# Patient Record
Sex: MALE | Race: AMERICAN INDIAN OR ALASKA NATIVE
[De-identification: names, ages, dates, MRNs, and addresses within clinical notes are randomized per-mention and may not be internally consistent; named-entity substitution may affect disease eponyms.]

---

## 2019-07-01 ENCOUNTER — HOSPITAL ENCOUNTER (INPATIENT)
Dept: HOSPITAL 5 - ED | Age: 74
LOS: 8 days | Discharge: HOME | DRG: 682 | End: 2019-07-09
Attending: INTERNAL MEDICINE | Admitting: INTERNAL MEDICINE
Payer: MEDICARE

## 2019-07-01 DIAGNOSIS — N17.0: Primary | ICD-10-CM

## 2019-07-01 DIAGNOSIS — Z87.891: ICD-10-CM

## 2019-07-01 DIAGNOSIS — I16.0: ICD-10-CM

## 2019-07-01 DIAGNOSIS — E87.6: ICD-10-CM

## 2019-07-01 DIAGNOSIS — J44.9: ICD-10-CM

## 2019-07-01 DIAGNOSIS — Z91.14: ICD-10-CM

## 2019-07-01 DIAGNOSIS — E44.0: ICD-10-CM

## 2019-07-01 DIAGNOSIS — R13.10: ICD-10-CM

## 2019-07-01 DIAGNOSIS — I11.0: ICD-10-CM

## 2019-07-01 DIAGNOSIS — Z88.1: ICD-10-CM

## 2019-07-01 DIAGNOSIS — I42.0: ICD-10-CM

## 2019-07-01 DIAGNOSIS — E87.0: ICD-10-CM

## 2019-07-01 DIAGNOSIS — J96.01: ICD-10-CM

## 2019-07-01 DIAGNOSIS — I50.43: ICD-10-CM

## 2019-07-01 PROCEDURE — 83735 ASSAY OF MAGNESIUM: CPT

## 2019-07-01 PROCEDURE — 93010 ELECTROCARDIOGRAM REPORT: CPT

## 2019-07-01 PROCEDURE — 82550 ASSAY OF CK (CPK): CPT

## 2019-07-01 PROCEDURE — 82553 CREATINE MB FRACTION: CPT

## 2019-07-01 PROCEDURE — 96374 THER/PROPH/DIAG INJ IV PUSH: CPT

## 2019-07-01 PROCEDURE — G0480 DRUG TEST DEF 1-7 CLASSES: HCPCS

## 2019-07-01 PROCEDURE — 36415 COLL VENOUS BLD VENIPUNCTURE: CPT

## 2019-07-01 PROCEDURE — 83970 ASSAY OF PARATHORMONE: CPT

## 2019-07-01 PROCEDURE — 80053 COMPREHEN METABOLIC PANEL: CPT

## 2019-07-01 PROCEDURE — 85025 COMPLETE CBC W/AUTO DIFF WBC: CPT

## 2019-07-01 PROCEDURE — 93005 ELECTROCARDIOGRAM TRACING: CPT

## 2019-07-01 PROCEDURE — 84484 ASSAY OF TROPONIN QUANT: CPT

## 2019-07-01 PROCEDURE — 83880 ASSAY OF NATRIURETIC PEPTIDE: CPT

## 2019-07-01 PROCEDURE — 93306 TTE W/DOPPLER COMPLETE: CPT

## 2019-07-01 PROCEDURE — 74230 X-RAY XM SWLNG FUNCJ C+: CPT

## 2019-07-01 PROCEDURE — 71045 X-RAY EXAM CHEST 1 VIEW: CPT

## 2019-07-01 PROCEDURE — 84300 ASSAY OF URINE SODIUM: CPT

## 2019-07-01 PROCEDURE — 80048 BASIC METABOLIC PNL TOTAL CA: CPT

## 2019-07-01 PROCEDURE — 82570 ASSAY OF URINE CREATININE: CPT

## 2019-07-01 PROCEDURE — 80320 DRUG SCREEN QUANTALCOHOLS: CPT

## 2019-07-01 PROCEDURE — 76770 US EXAM ABDO BACK WALL COMP: CPT

## 2019-07-01 PROCEDURE — 84156 ASSAY OF PROTEIN URINE: CPT

## 2019-07-01 PROCEDURE — 81001 URINALYSIS AUTO W/SCOPE: CPT

## 2019-07-02 LAB
ALBUMIN SERPL-MCNC: 3.2 G/DL (ref 3.9–5)
ALT SERPL-CCNC: 19 UNITS/L (ref 7–56)
BASOPHILS # (AUTO): 0.1 K/MM3 (ref 0–0.1)
BASOPHILS NFR BLD AUTO: 1.5 % (ref 0–1.8)
BILIRUB UR QL STRIP: (no result)
BLOOD UR QL VISUAL: (no result)
BUN SERPL-MCNC: 26 MG/DL (ref 9–20)
BUN SERPL-MCNC: 29 MG/DL (ref 9–20)
BUN/CREAT SERPL: 16 %
BUN/CREAT SERPL: 17 %
CALCIUM SERPL-MCNC: 8.1 MG/DL (ref 8.4–10.2)
CALCIUM SERPL-MCNC: 8.5 MG/DL (ref 8.4–10.2)
CREATININE,URINE: 64.4 MG/DL (ref 0.1–20)
EOSINOPHIL # BLD AUTO: 0.2 K/MM3 (ref 0–0.4)
EOSINOPHIL NFR BLD AUTO: 4.9 % (ref 0–4.3)
HCT VFR BLD CALC: 40.6 % (ref 35.5–45.6)
HEMOLYSIS INDEX: 2
HEMOLYSIS INDEX: 4
HGB BLD-MCNC: 13.1 GM/DL (ref 11.8–15.2)
LYMPHOCYTES # BLD AUTO: 0.9 K/MM3 (ref 1.2–5.4)
LYMPHOCYTES NFR BLD AUTO: 19.6 % (ref 13.4–35)
MCHC RBC AUTO-ENTMCNC: 32 % (ref 32–34)
MCV RBC AUTO: 88 FL (ref 84–94)
MONOCYTES # (AUTO): 0.5 K/MM3 (ref 0–0.8)
MONOCYTES % (AUTO): 10.7 % (ref 0–7.3)
PH UR STRIP: 6 [PH] (ref 5–7)
PLATELET # BLD: 310 K/MM3 (ref 140–440)
PROT UR STRIP-MCNC: (no result) MG/DL
PROTEIN/CREATININE RATIO,URINE: 0.29
RBC # BLD AUTO: 4.63 M/MM3 (ref 3.65–5.03)
RBC #/AREA URNS HPF: 1 /HPF (ref 0–6)
UROBILINOGEN UR-MCNC: 4 MG/DL (ref ?–2)
WBC #/AREA URNS HPF: < 1 /HPF (ref 0–6)

## 2019-07-02 RX ADMIN — ACETAMINOPHEN PRN MG: 325 TABLET ORAL at 21:54

## 2019-07-02 RX ADMIN — AMLODIPINE BESYLATE SCH MG: 10 TABLET ORAL at 12:48

## 2019-07-02 RX ADMIN — HEPARIN SODIUM SCH UNIT: 5000 INJECTION, SOLUTION INTRAVENOUS; SUBCUTANEOUS at 12:53

## 2019-07-02 RX ADMIN — Medication SCH ML: at 15:19

## 2019-07-02 RX ADMIN — DOCUSATE SODIUM SCH MG: 100 CAPSULE, LIQUID FILLED ORAL at 12:46

## 2019-07-02 RX ADMIN — DOCUSATE SODIUM SCH MG: 100 CAPSULE, LIQUID FILLED ORAL at 21:54

## 2019-07-02 RX ADMIN — FUROSEMIDE SCH MG: 10 INJECTION, SOLUTION INTRAVENOUS at 12:49

## 2019-07-02 RX ADMIN — ASPIRIN SCH MG: 81 TABLET, CHEWABLE ORAL at 12:49

## 2019-07-02 RX ADMIN — HEPARIN SODIUM SCH: 5000 INJECTION, SOLUTION INTRAVENOUS; SUBCUTANEOUS at 21:58

## 2019-07-02 RX ADMIN — Medication SCH ML: at 21:58

## 2019-07-02 NOTE — HISTORY AND PHYSICAL REPORT
History of Present Illness


Date of examination: 07/02/19


Date of admission: 


07/02/2019


Chief complaint: 





Bilateral lower extremity edema


History of present illness: 





73-year-old -American male with history of COPD, CHF, HTN, throat cancer 

who presents to [facility] ED with complaints of progressively worsening bilateral 

lower extremity pitting edema for the past 2-3 days.  Patient is a poor 

historian and unable to communicate clearly.  He mumbles his words and has poor 

enunciation.  He states that he ran out of his oral diuretic (he thinks its 

Torsemide but is not certain) rapidly to 3 days ago.  Since run out of his 

medication, he is began experiencing progressively worsening shortness of breath

and bilateral lower extremity pitting edema.  He admits to orthopnea and dyspnea

with exertion. 





Denies: Fever, cough, hemoptysis, n/v/d, headache, or recent sick contact





Past History


Past Medical History: cancer (throat cancer), COPD, heart failure, hypertension


Past Surgical History: No surgical history


Social history: no significant social history


Family history: no significant family history





Medications and Allergies


                                    Allergies











Allergy/AdvReac Type Severity Reaction Status Date / Time


 


levofloxacin [From Levaquin] Allergy  Unknown Verified 07/02/19 00:40











                                Home Medications











 Medication  Instructions  Recorded  Confirmed  Last Taken  Type


 


Unobtainable  07/02/19 07/02/19 Unknown History











Active Meds: 


Active Medications





Acetaminophen (Tylenol)  650 mg PO Q4H PRN


   PRN Reason: Pain MILD(1-3)/Fever >100.5/HA


Albuterol (Proventil)  2.5 mg IH Q3HRT PRN


   PRN Reason: Shortness Of Breath


Amlodipine Besylate (Norvasc)  10 mg PO QDAY ARASH


Aspirin (Baby Aspirin)  81 mg PO QDAY ARASH


Docusate Sodium (Colace)  100 mg PO BID ARASH


Furosemide (Lasix)  40 mg IV DAILY ARASH


Heparin Sodium (Porcine) (Heparin)  5,000 unit SUB-Q Q12HR ARASH


Hydralazine HCl (Apresoline)  10 mg IV Q4HR PRN


   PRN Reason: Blood Pressure


Ondansetron HCl (Zofran)  4 mg IV Q8H PRN


   PRN Reason: Nausea And Vomiting


Oxycodone/Acetaminophen (Percocet 5/325)  1 tab PO Q6H PRN


   PRN Reason: Pain, Moderate (4-6)


Sodium Chloride (Sodium Chloride Flush Syringe 10 Ml)  10 ml IV BID ARASH


Sodium Chloride (Sodium Chloride Flush Syringe 10 Ml)  10 ml IV PRN PRN


   PRN Reason: LINE FLUSH











Review of Systems


All systems: negative (reviewed and no additional remarkable complaints except 

as noted below)


Cardiovascular: orthopnea, edema, shortness of breath, dyspnea on exertion, high

blood pressure, leg edema


Respiratory: shortness of breath, dyspnea on exertion





Exam





- Physical Exam


Narrative exam: 





Physical exam





General appearance: Present: Mild distress, poor historian, mumbling of words, 

awake, well-developed, older adult -American male





- EENT


Eyes: Present: PERRL, EOM intact


ENT: hearing intact, poor dentition





- Neck


Neck: Present: supple, normal ROM





- Respiratory


Respiratory effort: Non-labored


Respiratory: Bibasilar crackles R>L





- Cardiovascular


Heart rate:  59 (bpm)


Rhythm: Sinus bradycardia, nonspecific T wave abnormalities


Heart Sounds: Present: S1 & S2.  Absent: rub, click





- Extremities


Extremities: no ischemia, pulses intact, abnormal (bilateral lower extremity 2+ 

pitting edema)








- Peripheral Assessment


Peripheral Pulses: within normal limits


 


- Abdominal


General gastrointestinal: soft, non-tender, normal bowel sounds





- Integumentary


Integumentary: Present: warm, dry





- Musculoskeletal


Musculoskeletal: generalized weakness, able to move all extremities








- Psychiatric


Psychiatric: cooperative





- Constitutional


Vitals: 


                                        











Temp Pulse Resp BP Pulse Ox


 


 97.9 F   68   14   171/85   91 


 


 07/02/19 00:15  07/02/19 03:45  07/02/19 04:31  07/02/19 04:31  07/02/19 04:15














Results





- Labs


CBC & Chem 7: 


                                 07/02/19 01:11





                                 07/02/19 01:11


Labs: 


                             Laboratory Last Values











WBC  4.7 K/mm3 (4.5-11.0)   07/02/19  01:11    


 


RBC  4.63 M/mm3 (3.65-5.03)   07/02/19  01:11    


 


Hgb  13.1 gm/dl (11.8-15.2)   07/02/19  01:11    


 


Hct  40.6 % (35.5-45.6)   07/02/19  01:11    


 


MCV  88 fl (84-94)   07/02/19  01:11    


 


MCH  28 pg (28-32)   07/02/19  01:11    


 


MCHC  32 % (32-34)   07/02/19  01:11    


 


RDW  20.8 % (13.2-15.2)  H  07/02/19  01:11    


 


Plt Count  310 K/mm3 (140-440)   07/02/19  01:11    


 


Lymph % (Auto)  19.6 % (13.4-35.0)   07/02/19  01:11    


 


Mono % (Auto)  10.7 % (0.0-7.3)  H  07/02/19  01:11    


 


Eos % (Auto)  4.9 % (0.0-4.3)  H  07/02/19  01:11    


 


Baso % (Auto)  1.5 % (0.0-1.8)   07/02/19  01:11    


 


Lymph #  0.9 K/mm3 (1.2-5.4)  L  07/02/19  01:11    


 


Mono #  0.5 K/mm3 (0.0-0.8)   07/02/19  01:11    


 


Eos #  0.2 K/mm3 (0.0-0.4)   07/02/19  01:11    


 


Baso #  0.1 K/mm3 (0.0-0.1)   07/02/19  01:11    


 


Seg Neutrophils %  63.3 % (40.0-70.0)   07/02/19  01:11    


 


Seg Neutrophils #  3.0 K/mm3 (1.8-7.7)   07/02/19  01:11    


 


Sodium  147 mmol/L (137-145)  H  07/02/19  01:11    


 


Potassium  3.1 mmol/L (3.6-5.0)  L  07/02/19  01:11    


 


Chloride  104.3 mmol/L ()   07/02/19  01:11    


 


Carbon Dioxide  30 mmol/L (22-30)   07/02/19  01:11    


 


  16 mmol/L  07/02/19  01:11    


 


BUN  29 mg/dL (9-20)  H  07/02/19  01:11    


 


  1.8 mg/dL (0.8-1.5)  H  07/02/19  01:11    


 


Estimated GFR  37 ml/min  07/02/19  01:11    


 


  16 %  07/02/19  01:11    


 


Glucose  108 mg/dL ()  H  07/02/19  01:11    


 


Calcium  8.5 mg/dL (8.4-10.2)   07/02/19  01:11    


 


  1.30 mg/dL (0.1-1.2)  H  07/02/19  01:11    


 


AST  17 units/L (5-40)   07/02/19  01:11    


 


ALT  19 units/L (7-56)   07/02/19  01:11    


 


  172 units/L ()  H  07/02/19  01:11    


 


  64 units/L ()   07/02/19  01:11    


 


CK-MB (CK-2)  1.8 ng/mL (0.0-4.0)   07/02/19  01:11    


 


CK-MB (CK-2) Rel Index  2.8  (0-4)   07/02/19  01:11    


 


  0.014 ng/mL (0.00-0.029)   07/02/19  01:11    


 


NT-Pro-B Natriuret Pep  12261 pg/mL (0-900)  H  07/02/19  01:11    


 


  6.4 g/dL (6.3-8.2)   07/02/19  01:11    


 


  3.2 g/dL (3.9-5)  L  07/02/19  01:11    


 


  1.0 %  07/02/19  01:11    


 


Plasma/Serum Alcohol  < 0.01 % (0-0.07)   07/02/19  01:11    














- Imaging and Cardiology


EKG: image reviewed (59 bpm, sinus bradycardia)


Chest x-ray: report reviewed ( Cardiac silhouette is moderately enlarged. It is 

unclear if this is secondary to cardiomegaly or), image reviewed





Assessment and Plan


Assessment and plan: 





73-year-old -American male with history of COPD, CHF, HTN, throat cancer 

who presents to [facility] ED with complaints of progressively worsening bilateral 

lower extremity pitting edema for the past 2-3 days.  On examination patient has

bilateral lower extremity 2+ pitting edema.  On auscultation by basilar crackles

R>L. chest x-ray is suggestive of volume overload/CHF exacerbation.  Received IV

Lasix 40 mg 1 and ED; will continue to diurese.  Will admit to telemetry.





Acute exacerbation of CHF


Hypertensive urgency


Acute hypoxic respiratory failure


JAYDEN 


?? CKD3


Hypokalemia


Mild to moderate malnutrition


Elevated BNP 








Plan:


Continue supportive care


Continuous telemetry monitoring


Start IV Lasix 40mg daily


Saturation in low 80's on RA; Continue supplemental oxygen and wean as tolerated


Albuterol when necessary


Monitor BP


IV hydralazine when necessary


Start Norvasc 10mg daily


Monitor renal function


Creatinine this admission 1.8, baseline unknown; GFR 37 likely due to chronic 

renal failure


Nephrology consulted for JAYDEN


Avoid nephro toxic agents


Monitor electrolytes, replete as needed


Order IV potassium 40 mEq


[place] [address], likely due to heart failure


Swallow screen pending; if pass then will order diet; encourage oral intake


Dietitian consult pending


DVT PPX on Heparin





Advance Directives: No


VTE prophylaxis?: Chemical


Reason for no VTE Prophylaxis: Surgical contraindication

## 2019-07-02 NOTE — XRAY REPORT
CHEST 1 VIEW 



INDICATION / CLINICAL INFORMATION:

shortness of breath.



COMPARISON: 

None available.



FINDINGS:



SUPPORT DEVICES: None.



HEART / MEDIASTINUM: Cardiac silhouette is moderately enlarged. 



LUNGS / PLEURA: No significant pulmonary or pleural abnormality. No pneumothorax. 



ADDITIONAL FINDINGS: No significant additional findings.



IMPRESSION:

1. Cardiac silhouette is moderately enlarged. It is unclear if this is secondary to cardiomegaly or p
ericardial effusion.



Signer Name: Alise Roman MD 

 Signed: 7/2/2019 1:09 AM 

 Workstation Name: [id number]

## 2019-07-02 NOTE — CONSULTATION
History of Present Illness





- Reason for Consult


Consult date: 07/02/19


acute renal failure, hypokalemia





- History of Present Illness


The patient is a 74 YO AAM with history significant for HTN, COPD, CHF and 

Throat cancer who presented to [facility] ED with complaints of progressively 

worsening sob and bilateral leg swelling for the past 2-3 days.  Patient is a 

very poor historian and also mumbles his words.  There was no family member at 

the bedside.  He ran out of his oral diuretic about 3 days ago.  He also admits 

to orthopnea and KILLIAN.  Labs were significant for creatinine 1.8, Sodium 147 and 

potassium 3.1.  Nephrology was consulted for evaluation of Acute kidney injury.





Past History


Past Medical History: cancer (throat cancer), COPD, heart failure, hypertension


Past Surgical History: No surgical history


Social history: no significant social history


Family history: no significant family history





Medications and Allergies


                                    Allergies











Allergy/AdvReac Type Severity Reaction Status Date / Time


 


levofloxacin [From Levaquin] Allergy  Unknown Verified 07/02/19 00:40











                                Home Medications











 Medication  Instructions  Recorded  Confirmed  Last Taken  Type


 


Unobtainable  07/02/19 07/02/19 Unknown History











Active Meds: 


Active Medications





Acetaminophen (Tylenol)  650 mg PO Q4H PRN


   PRN Reason: Pain MILD(1-3)/Fever >100.5/HA


Albuterol (Proventil)  2.5 mg IH Q3HRT PRN


   PRN Reason: Shortness Of Breath


Amlodipine Besylate (Norvasc)  10 mg PO QDAY ARASH


Aspirin (Baby Aspirin)  81 mg PO QDAY ARASH


Docusate Sodium (Colace)  100 mg PO BID ARASH


Furosemide (Lasix)  40 mg IV DAILY ARASH


Heparin Sodium (Porcine) (Heparin)  5,000 unit SUB-Q Q12HR ARASH


Hydralazine HCl (Apresoline)  10 mg IV Q4H PRN


   PRN Reason: Blood Pressure


Ondansetron HCl (Zofran)  4 mg IV Q8H PRN


   PRN Reason: Nausea And Vomiting


Oxycodone/Acetaminophen (Percocet 5/325)  1 tab PO Q6H PRN


   PRN Reason: Pain, Moderate (4-6)


Sodium Chloride (Sodium Chloride Flush Syringe 10 Ml)  10 ml IV BID ARASH


Sodium Chloride (Sodium Chloride Flush Syringe 10 Ml)  10 ml IV PRN PRN


   PRN Reason: LINE FLUSH











Review of Systems


ROS unobtainable: due to mental status (unable to obtain)





Exam





- Vital Signs


Vital signs: 


                                   Vital Signs











Temp Pulse Resp BP Pulse Ox


 


 97.9 F   60   12   155/77   100 


 


 07/02/19 00:15  07/02/19 00:15  07/02/19 00:15  07/02/19 00:15  07/02/19 00:15














- General Appearance


General appearance: well-developed, well-nourished, appears stated age, other 

(not in distress)


EENT: ATNC, PERRL, mucous membranes moist, hearing diminished


Neck: Present: neck supple, trachea midline


Respiratory: Rales


Heart: regular, S1S2, no murmurs


Gastrointestinal: Present: normoactive bowel sounds.  Absent: tenderness, 

distended


Integumentary: no rash, warm and dry


Neurologic: no focal deficit, no asterixis, other (?confused, mumbling)


Musculoskeletal: Present: other (b/l LE 2+ edema noted.)





Results





- Lab Results





                                 07/02/19 01:11





                                 07/02/19 01:11


                             Most recent lab results











Calcium  8.5 mg/dL (8.4-10.2)   07/02/19  01:11    














- Image


Kidney/bladder ultrasound: pending





Assessment and Plan





1. Acute kidney injury:


Likely Vasomotor JAYDEN in the setting of CHF.


Suspect Cardio-renal syndrome.


Baseline reanl function is unknown.


Urine lytes and Renal US ordered.


Monitor renal function.


Renal prognosis is guarded.


Avoid nephrotoxic agents.


Meds dosage based on GFR.





2. FEN:


Hypernatremia, monitor.


Hypokalemia, replete K.


Monitor lytes.





3. Acute decompensated CHF.





4. Hypertension:


Monitor BP.





5. H/o throat cancer.

## 2019-07-02 NOTE — EMERGENCY DEPARTMENT REPORT
HPI





- General


Chief Complaint: Extremity Injury, Lower


Time Seen by Provider: 19 00:32





- HPI


HPI: 





Room 6








The patient is 73-year-old male presenting with chief complaint of bilateral 

lower extremity edema.  The patient has a history of CHF and states she's been 

out of his diuretic for the past 2-3 days.  The patient states for the past 2-3 

days he has developed worsening bilateral lower extremity edema.  Patient also 

states he's been feeling short of breath 1 day.








Location: [See above]


Duration: [See above]


Quality:  [See above]


Severity:  [See above]


Modifying factors: [see above]


Context: [see above]


Mode of transportation: [not driving]





ED Past Medical Hx





- Past Medical History


Previous Medical History?: Yes


Hx Hypertension: Yes


Hx Congestive Heart Failure: Yes


Hx COPD: Yes


Additional medical history: Throat cancer





- Surgical History


Past Surgical History?: No





- Family History


Family history: no significant





- Social History


Smoking Status: Former Smoker (none since )


Substance Use Type: None





- Medications


Home Medications: 


                                Home Medications











 Medication  Instructions  Recorded  Confirmed  Last Taken  Type


 


Unobtainable  19 Unknown History














ED Review of Systems


ROS: 


Stated complaint: BILATERAL LEG SWELLING


Other details as noted in HPI





Constitutional: no symptoms reported


Eyes: denies: eye pain


Respiratory: shortness of breath


Cardiovascular: denies: chest pain


Endocrine: no symptoms reported


Gastrointestinal: denies: abdominal pain


Genitourinary: denies: dysuria


Musculoskeletal: denies: back pain


Neurological: denies: headache





Physical Exam





- Physical Exam


Vital Signs: 


                                   Vital Signs











  19





  00:15


 


Temperature 97.9 F


 


Pulse Rate 60


 


Respiratory 12





Rate 


 


Blood Pressure 155/77


 


O2 Sat by Pulse 100





Oximetry 











Physical Exam: 





GENERAL: The patient is well-developed well-nourished male lying on stretcher 

not appearing to be in acute distress. []


HEENT: Normocephalic.  Atraumatic.  Extraocular motions are intact.  Patient has

 moist mucous membranes.


NECK: Supple.  Trachea midline


CHEST/LUNGS: Crackles at the left base.  There is no respiratory distress noted.


HEART/CARDIOVASCULAR: Regular.  There is no tachycardia.  There is no gallop rub

 or murmur.


ABDOMEN: Abdomen is soft, nontender.  Patient has normal bowel sounds.  There is

 no abdominal distention.


SKIN: There is no rash.  There is 2+ bilateral lower extremity pitting edema.  

There is no diaphoresis.


NEURO: The patient is awake, alert, and oriented.  The patient is cooperative.  

The patient has mumbling speech with poor enunciation.  There are no focal 

neurologic deficits.  Cranial nerves II through XII grossly intact, no drift


MUSCULOSKELETAL:  There is no evidence of acute injury.





ED Course


                                   Vital Signs











  19





  00:15


 


Temperature 97.9 F


 


Pulse Rate 60


 


Respiratory 12





Rate 


 


Blood Pressure 155/77


 


O2 Sat by Pulse 100





Oximetry 














ED Medical Decision Making





- Lab Data


Result diagrams: 


                                 19 01:11





                                 19 01:11





                                Laboratory Tests











  19





  01:11 01:11 01:11


 


WBC  4.7  


 


RBC  4.63  


 


Hgb  13.1  


 


Hct  40.6  


 


MCV  88  


 


MCH  28  


 


MCHC  32  


 


RDW  20.8 H  


 


Plt Count  310  


 


Lymph % (Auto)  19.6  


 


Mono % (Auto)  10.7 H  


 


Eos % (Auto)  4.9 H  


 


Baso % (Auto)  1.5  


 


Lymph #  0.9 L  


 


Mono #  0.5  


 


Eos #  0.2  


 


Baso #  0.1  


 


Seg Neutrophils %  63.3  


 


Seg Neutrophils #  3.0  


 


Sodium   147 H 


 


Potassium   3.1 L 


 


Chloride   104.3 


 


Carbon Dioxide   30 


 


Anion Gap   16 


 


BUN   29 H 


 


Creatinine   1.8 H 


 


Estimated GFR   37 


 


BUN/Creatinine Ratio   16 


 


Glucose   108 H 


 


Calcium   8.5 


 


Total Bilirubin   1.30 H 


 


AST   17 


 


ALT   19 


 


Alkaline Phosphatase   172 H 


 


Total Creatine Kinase   64 


 


CK-MB (CK-2)   1.8 


 


CK-MB (CK-2) Rel Index   2.8 


 


Troponin T   0.014 


 


NT-Pro-B Natriuret Pep   67286 H 


 


Total Protein   6.4 


 


Albumin   3.2 L 


 


Albumin/Globulin Ratio   1.0 


 


Plasma/Serum Alcohol    < 0.01














- EKG Data


-: EKG Interpreted by Me


EKG shows normal: sinus rhythm


Rate: bradycardia (59 bpm)





- EKG Data


When compared to previous EKG there are: previous EKG unavailable


Interpretation: nonspecific ST-T wave marcos (T-wave inversion in lead V5, V6, 2, 

3, aVF)





- Radiology Data


Radiology results: report reviewed (chest x-ray), image reviewed (chest x-ray)


interpreted by me: 





Chest x-ray-cardiomegaly





[facility] [address] [place] [address] 

XRay Report Signed Patient: ЮЛИЯ CLEVELAND MR#: [id number] : 1945 

Acct:[id number] Age/Sex: 73 / M ADM Date: 19 Loc: ED Attending Dr: 

Ordering Physician: JOI SABILLON MD Date of Service: 19 Procedure(s): XR 

chest 1V ap Accession Number(s): [id number] cc: JOI SABILLON MD Fluoro Time In Min

utes: CHEST 1 VIEW INDICATION / CLINICAL INFORMATION: shortness of breath. 

COMPARISON: None available. FINDINGS: SUPPORT DEVICES: None. HEART / 

MEDIASTINUM: Cardiac silhouette is moderately enlarged. LUNGS / PLEURA: No 

significant pulmonary or pleural abnormality. No pneumothorax. ADDITIONAL 

FINDINGS: No significant additional findings. IMPRESSION: 1. Cardiac silhouette 

is moderately enlarged. It is unclear if this is secondary to cardiomegaly or 

pericardial effusion. Signer Name: Alise Roman MD Signed: 2019 1:09 AM 

Workstation Name: [id number] Transcribed By: REF Dictated By: ARSLAN PARKS MD Electronically Authenticated By: ARSLAN PARKS MD Signed 

Date/Time: 19 DD/DT: 19 TD/TT: 





- Differential Diagnosis


CHF exacerbation, pneumonia, renal failure


Critical care attestation.: 


If time is entered above; I have spent that time in minutes in the direct care 

of this critically ill patient, excluding procedure time.








ED Disposition


Clinical Impression: 


 CHF exacerbation, Shortness of breath, Peripheral edema





Disposition:  OP ADMIT IP TO THIS HOSP


Is pt being admited?: Yes


Does the pt Need Aspirin: Yes


Condition: Fair


Time of Disposition: 04:21 (hospitalist notified (Aidee))

## 2019-07-02 NOTE — FLUOROSCOPY REPORT
FLUOROSCOPY BARIUM SWALLOW MODIFIED



HISTORY: Dysphagia.



FINDINGS: Fluoroscopy was provided by radiology during modified barium swallow by speech pathology. 2
 fluoroscopic images were saved. 1.5 minutes of fluoroscopy time was utilized. Aspiration with thin l
iquids was witnessed during this exam. Please correlate with the formal report by speech pathology.



IMPRESSION: Successful modified barium swallow.



Signer Name: Jermain Canchola Jr, MD 

 Signed: 7/2/2019 1:42 PM 

 Workstation Name: [id number]

## 2019-07-02 NOTE — EVENT NOTE
Date: 07/02/19





patient had 6 runs of V.tach around 17:10. patient was seen and evaluated. 

patient didn't have chest pain or SOB. he is c/o SOB. He had hypokalemia this 

morning and was repleted. will check potassium and Mg stat. Will do EKG. Will 

get cardiology consult. Discussed the plan of care with the patient's nurse.

## 2019-07-02 NOTE — EVENT NOTE
Date: 07/02/19





73-year-old male with past medical history significant for CHF, throat cancer 

was admitted this morning for bilateral leg swelling, medication non-compliance,

JAYDEN and electrolyte abnormalities. Nephrology consulted. continue management as 

outlined in H/P.

## 2019-07-03 LAB
BASOPHILS # (AUTO): 0.1 K/MM3 (ref 0–0.1)
BASOPHILS NFR BLD AUTO: 1.4 % (ref 0–1.8)
BUN SERPL-MCNC: 21 MG/DL (ref 9–20)
BUN SERPL-MCNC: 23 MG/DL (ref 9–20)
BUN/CREAT SERPL: 16 %
BUN/CREAT SERPL: 19 %
CALCIUM SERPL-MCNC: 8.7 MG/DL (ref 8.4–10.2)
CALCIUM SERPL-MCNC: 8.7 MG/DL (ref 8.4–10.2)
EOSINOPHIL # BLD AUTO: 0.3 K/MM3 (ref 0–0.4)
EOSINOPHIL NFR BLD AUTO: 7.5 % (ref 0–4.3)
HCT VFR BLD CALC: 40.4 % (ref 35.5–45.6)
HEMOLYSIS INDEX: 2
HEMOLYSIS INDEX: 3
HGB BLD-MCNC: 13.1 GM/DL (ref 11.8–15.2)
LYMPHOCYTES # BLD AUTO: 1.1 K/MM3 (ref 1.2–5.4)
LYMPHOCYTES NFR BLD AUTO: 25.7 % (ref 13.4–35)
MCHC RBC AUTO-ENTMCNC: 33 % (ref 32–34)
MCV RBC AUTO: 87 FL (ref 84–94)
MONOCYTES # (AUTO): 0.5 K/MM3 (ref 0–0.8)
MONOCYTES % (AUTO): 10.7 % (ref 0–7.3)
PLATELET # BLD: 309 K/MM3 (ref 140–440)
RBC # BLD AUTO: 4.67 M/MM3 (ref 3.65–5.03)

## 2019-07-03 RX ADMIN — POTASSIUM CHLORIDE SCH MEQ: 1.5 POWDER, FOR SOLUTION ORAL at 13:45

## 2019-07-03 RX ADMIN — OXYCODONE AND ACETAMINOPHEN PRN TAB: 5; 325 TABLET ORAL at 07:55

## 2019-07-03 RX ADMIN — FUROSEMIDE SCH MG: 10 INJECTION, SOLUTION INTRAVENOUS at 09:58

## 2019-07-03 RX ADMIN — OXYCODONE AND ACETAMINOPHEN PRN TAB: 5; 325 TABLET ORAL at 13:40

## 2019-07-03 RX ADMIN — ASPIRIN SCH MG: 81 TABLET, CHEWABLE ORAL at 09:58

## 2019-07-03 RX ADMIN — HEPARIN SODIUM SCH: 5000 INJECTION, SOLUTION INTRAVENOUS; SUBCUTANEOUS at 22:07

## 2019-07-03 RX ADMIN — ACETAMINOPHEN PRN MG: 325 TABLET ORAL at 22:05

## 2019-07-03 RX ADMIN — HEPARIN SODIUM SCH: 5000 INJECTION, SOLUTION INTRAVENOUS; SUBCUTANEOUS at 10:00

## 2019-07-03 RX ADMIN — Medication SCH ML: at 22:07

## 2019-07-03 RX ADMIN — DOCUSATE SODIUM SCH MG: 100 CAPSULE, LIQUID FILLED ORAL at 22:05

## 2019-07-03 RX ADMIN — HYDRALAZINE HYDROCHLORIDE PRN MG: 20 INJECTION INTRAMUSCULAR; INTRAVENOUS at 13:40

## 2019-07-03 RX ADMIN — POTASSIUM CHLORIDE SCH MEQ: 1.5 POWDER, FOR SOLUTION ORAL at 08:40

## 2019-07-03 RX ADMIN — HYDRALAZINE HYDROCHLORIDE PRN MG: 20 INJECTION INTRAMUSCULAR; INTRAVENOUS at 06:03

## 2019-07-03 RX ADMIN — HEPARIN SODIUM SCH UNIT: 5000 INJECTION, SOLUTION INTRAVENOUS; SUBCUTANEOUS at 09:59

## 2019-07-03 RX ADMIN — OXYCODONE AND ACETAMINOPHEN PRN TAB: 5; 325 TABLET ORAL at 18:30

## 2019-07-03 RX ADMIN — DOCUSATE SODIUM SCH MG: 100 CAPSULE, LIQUID FILLED ORAL at 09:58

## 2019-07-03 RX ADMIN — Medication SCH ML: at 09:59

## 2019-07-03 RX ADMIN — ACETAMINOPHEN PRN MG: 325 TABLET ORAL at 05:57

## 2019-07-03 RX ADMIN — AMLODIPINE BESYLATE SCH MG: 10 TABLET ORAL at 09:59

## 2019-07-03 NOTE — PROGRESS NOTE
Assessment and Plan





1. Acute kidney injury:


Likely Vasomotor JAYDEN in the setting of CHF.


Suspect Cardio-renal syndrome.


Renal US was negative for hydro.


Renal function is improving.


Monitor renal function.


Avoid nephrotoxic agents.


Meds dosage based on GFR.





2. FEN:


Hypernatremia, monitor.


Hypokalemia, replete K.


Monitor lytes.





3. Acute decompensated CHF.





4. Hypertension:


Monitor BP.





5. H/o throat cancer.











Subjective


Date of service: 07/03/19


Interval history: 


Patient was seen and examined at the bedside.


Doing ok.








Objective





- Vital Signs


Vital signs: 


                               Vital Signs - 12hr











  07/03/19 07/03/19 07/03/19





  05:57 06:00 06:02


 


Temperature   98.6 F


 


Pulse Rate  56 L 65


 


Respiratory 22  22





Rate   


 


Blood Pressure   164/101


 


O2 Sat by Pulse   98





Oximetry   














  07/03/19 07/03/19 07/03/19





  06:03 08:07 11:00


 


Temperature  98.6 F 


 


Pulse Rate 66 66 


 


Respiratory  18 20





Rate   


 


Blood Pressure 164/101 164/90 


 


O2 Sat by Pulse  97 





Oximetry   














  07/03/19 07/03/19





  14:00 15:49


 


Temperature  98.3 F


 


Pulse Rate 66 65


 


Respiratory  20





Rate  


 


Blood Pressure  160/77


 


O2 Sat by Pulse  86





Oximetry  














- General Appearance


General appearance: well-developed, well-nourished, appears stated age, other 

(no distress)


EENT: ATNC, PERRL, hearing diminished


Neck: supple


Respiratory: Present: Rales


Cardiology: S1S2, no murmurs


Gastrointestinal: normoactive bowel sounds, no tenderness, no distended


Integumentary: no rash, warm and dry


Neurologic: no focal deficit, no asterixis, alert and oriented x3, other 

(mumbling)


Musculoskeletal: other (1+ edema of both LEs noted)





- Lab





                                 07/03/19 04:43





                                 07/03/19 13:48


                             Most recent lab results











Calcium  8.7 mg/dL (8.4-10.2)   07/03/19  13:48    


 


Magnesium  1.80 mg/dL (1.7-2.3)   07/02/19  19:08    


 


  64.4 mg/dL (0.1-20.0)  H  07/02/19  Unknown


 


  49 mmol/L  07/02/19  Unknown


 


  18 mg/dL (5-11.8)  H  07/02/19  Unknown














Medications & Allergies





- Medications


Allergies/Adverse Reactions: 


                                    Allergies





levofloxacin [From Levaquin] Allergy (Verified 07/02/19 00:40)


   Unknown








Home Medications: 


                                Home Medications











 Medication  Instructions  Recorded  Confirmed  Last Taken  Type


 


Coreg 3.125 mg PO BID 07/02/19 07/02/19 06/30/19 History


 


Furosemide 20 mg PO DAILY 07/02/19 07/02/19 06/30/19 History


 


Oxybutynin 2.5 mg PO DAILY 07/02/19 07/02/19 06/30/19 History


 


Pepcid 20 mg PO BID 07/02/19 07/02/19 06/30/19 History


 


Ranitidine HCl 150 mg PO BID 07/02/19 07/02/19 06/30/19 History


 


hydrALAZINE 50 mg PO TID 07/02/19 07/02/19 06/30/19 History











Active Medications: 














Generic Name Dose Route Start Last Admin





  Trade Name Freq  PRN Reason Stop Dose Admin


 


Acetaminophen  650 mg  07/02/19 04:52  07/03/19 05:57





  Tylenol  PO   650 mg





  Q4H PRN   Administration





  Pain MILD(1-3)/Fever >100.5/HA  


 


Albuterol  2.5 mg  07/02/19 04:52 





  Proventil  IH  





  Q3HRT PRN  





  Shortness Of Breath  


 


Amlodipine Besylate  10 mg  07/02/19 10:00  07/03/19 09:59





  Norvasc  PO   10 mg





  QDAY ARASH   Administration


 


Aspirin  81 mg  07/02/19 10:00  07/03/19 09:58





  Baby Aspirin  PO   81 mg





  QDAY ARASH   Administration


 


Docusate Sodium  100 mg  07/02/19 10:00  07/03/19 09:58





  Colace  PO   100 mg





  BID ARASH   Administration


 


Furosemide  40 mg  07/02/19 10:00  07/03/19 09:58





  Lasix  IV   40 mg





  DAILY ARASH   Administration


 


Heparin Sodium (Porcine)  5,000 unit  07/02/19 10:00  07/03/19 09:59





  Heparin  SUB-Q   5,000 unit





  Q12HR ARASH   Administration


 


Hydralazine HCl  10 mg  07/02/19 04:57  07/03/19 13:40





  Apresoline  IV   10 mg





  Q4H PRN   Administration





  Blood Pressure  


 


Ondansetron HCl  4 mg  07/02/19 04:52 





  Zofran  IV  





  Q8H PRN  





  Nausea And Vomiting  


 


Oxycodone/Acetaminophen  1 tab  07/02/19 04:52  07/03/19 07:55





  Percocet 5/325  PO   1 tab





  Q6H PRN   Administration





  Pain, Moderate (4-6)  


 


Potassium Chloride  40 meq  07/03/19 16:53 





  K-Dur  PO  07/03/19 16:54 





  ONCE ONE  


 


Sodium Chloride  10 ml  07/02/19 10:00  07/03/19 09:59





  Sodium Chloride Flush Syringe 10 Ml  IV   10 ml





  BID ARASH   Administration


 


Sodium Chloride  10 ml  07/02/19 04:52 





  Sodium Chloride Flush Syringe 10 Ml  IV  





  PRN PRN  





  LINE FLUSH

## 2019-07-03 NOTE — PROGRESS NOTE
Assessment and Plan


Assessment and plan: 





73-year-old -American male with history of COPD, CHF, HTN, throat cancer 

who presents to [facility] ED with complaints of progressively worsening bilateral 

lower extremity pitting edema for the past 2-3 days.  On examination patient has

bilateral lower extremity 2+ pitting edema.   chest x-ray is suggestive of 

volume overload/CHF exacerbation.


Acute exacerbation of CHF, Bilateral leg swelling


- Patient denied SOB orchest pain


- Will get echo


Hypertensive urgency


- Controlled


- continue current medication regimen


Acute hypoxic respiratory failure


- Patient is saturating well on room air


JAYDEN 


- Creatinine is trending down


Hypokalemia


- repleted, will check BMP now


- magnesium level is normal


Mild to moderate malnutrition


- Nutrition consult


Dysphagia


- Speech therapy evaluated her and recommend [place] consistency


deconditioned


- PT/OT consulted





Disposition; Patient may need SNF placement at discharge.





History


Interval history: 


Patient was seen and evaluated this morning, difficult to understand because of 

his mumbling sound.








Hospitalist Physical





- Physical exam


Narrative exam: 





 Not in cardiopulmonary distress. 


 The patient appeared well nourished and normally developed.


 Vital signs as documented.


 Head exam is unremarkable.


 No scleral icterus .


 Neck is without jugular venous distension, thyromegaly, or carotid bruits. 


 Lungs are clear to auscultation.


Cardiac exam reveals regular rate and  Rhythm.


Abdominal exam reveals normal bowel sounds, no masses, no organomegaly and no 

aortic enlargement. 


Extremities are nonedematous and both femoral and pedal pulses are normal.


CNS: Alert and oriented 3.  No focal weakness.





- Constitutional


Vitals: 


                                        











Temp Pulse Resp BP Pulse Ox


 


 98.6 F   66   20   164/90   97 


 


 07/03/19 08:07  07/03/19 08:07  07/03/19 11:00  07/03/19 08:07  07/03/19 08:07














Results





- Labs


CBC & Chem 7: 


                                 07/03/19 04:43





                                 07/03/19 04:43


Labs: 


                             Laboratory Last Values











WBC  4.4 K/mm3 (4.5-11.0)  L  07/03/19  04:43    


 


RBC  4.67 M/mm3 (3.65-5.03)   07/03/19  04:43    


 


Hgb  13.1 gm/dl (11.8-15.2)   07/03/19  04:43    


 


Hct  40.4 % (35.5-45.6)   07/03/19  04:43    


 


MCV  87 fl (84-94)   07/03/19  04:43    


 


MCH  28 pg (28-32)   07/03/19  04:43    


 


MCHC  33 % (32-34)   07/03/19  04:43    


 


RDW  20.4 % (13.2-15.2)  H  07/03/19  04:43    


 


Plt Count  309 K/mm3 (140-440)   07/03/19  04:43    


 


Lymph % (Auto)  25.7 % (13.4-35.0)   07/03/19  04:43    


 


Mono % (Auto)  10.7 % (0.0-7.3)  H  07/03/19  04:43    


 


Eos % (Auto)  7.5 % (0.0-4.3)  H  07/03/19  04:43    


 


Baso % (Auto)  1.4 % (0.0-1.8)   07/03/19  04:43    


 


Lymph #  1.1 K/mm3 (1.2-5.4)  L  07/03/19  04:43    


 


Mono #  0.5 K/mm3 (0.0-0.8)   07/03/19  04:43    


 


Eos #  0.3 K/mm3 (0.0-0.4)   07/03/19  04:43    


 


Baso #  0.1 K/mm3 (0.0-0.1)   07/03/19  04:43    


 


Seg Neutrophils %  54.7 % (40.0-70.0)   07/03/19  04:43    


 


Seg Neutrophils #  2.4 K/mm3 (1.8-7.7)   07/03/19  04:43    


 


Sodium  146 mmol/L (137-145)  H  07/03/19  04:43    


 


Potassium  3.2 mmol/L (3.6-5.0)  L  07/03/19  04:43    


 


Chloride  103.1 mmol/L ()   07/03/19  04:43    


 


Carbon Dioxide  29 mmol/L (22-30)   07/03/19  04:43    


 


  17 mmol/L  07/03/19  04:43    


 


BUN  23 mg/dL (9-20)  H  07/03/19  04:43    


 


  1.4 mg/dL (0.8-1.5)   07/03/19  04:43    


 


Estimated GFR  > 60 ml/min  07/03/19  04:43    


 


  16 %  07/03/19  04:43    


 


Glucose  116 mg/dL ()  H  07/03/19  04:43    


 


Calcium  8.7 mg/dL (8.4-10.2)   07/03/19  04:43    


 


Magnesium  1.80 mg/dL (1.7-2.3)   07/02/19  19:08    


 


  1.30 mg/dL (0.1-1.2)  H  07/02/19  01:11    


 


AST  17 units/L (5-40)   07/02/19  01:11    


 


ALT  19 units/L (7-56)   07/02/19  01:11    


 


  172 units/L ()  H  07/02/19  01:11    


 


  64 units/L ()   07/02/19  01:11    


 


CK-MB (CK-2)  1.8 ng/mL (0.0-4.0)   07/02/19  01:11    


 


CK-MB (CK-2) Rel Index  2.8  (0-4)   07/02/19  01:11    


 


  0.014 ng/mL (0.00-0.029)   07/02/19  01:11    


 


NT-Pro-B Natriuret Pep  51851 pg/mL (0-900)  H  07/02/19  01:11    


 


  6.4 g/dL (6.3-8.2)   07/02/19  01:11    


 


  3.2 g/dL (3.9-5)  L  07/02/19  01:11    


 


  1.0 %  07/02/19  01:11    


 


PTH Intact  232.5 pg/mL (15-65)  H  07/03/19  04:43    


 


  Yellow  (Yellow)   07/02/19  Unknown


 


  Clear  (Clear)   07/02/19  Unknown


 


  6.0  (5.0-7.0)   07/02/19  Unknown


 


Ur Specific Gravity  1.012  (1.003-1.030)   07/02/19  Unknown


 


  <15 mg/dl mg/dL (Negative)   07/02/19  Unknown


 


  Neg mg/dL (Negative)   07/02/19  Unknown


 


  Neg mg/dL (Negative)   07/02/19  Unknown


 


  Neg  (Negative)   07/02/19  Unknown


 


  Neg  (Negative)   07/02/19  Unknown


 


  Neg  (Negative)   07/02/19  Unknown


 


  4.0 mg/dL (<2.0)   07/02/19  Unknown


 


Ur Leukocyte Esterase  Neg  (Negative)   07/02/19  Unknown


 


  < 1.0 /HPF (0.0-6.0)   07/02/19  Unknown


 


  1.0 /HPF (0.0-6.0)   07/02/19  Unknown


 


  64.4 mg/dL (0.1-20.0)  H  07/02/19  Unknown


 


Protein/Creatinin Ratio  0.29   07/02/19  Unknown


 


  49 mmol/L  07/02/19  Unknown


 


  18 mg/dL (5-11.8)  H  07/02/19  Unknown


 


Plasma/Serum Alcohol  < 0.01 % (0-0.07)   07/02/19  01:11    














Active Medications





- Current Medications


Current Medications: 














Generic Name Dose Route Start Last Admin





  Trade Name Freq  PRN Reason Stop Dose Admin


 


Acetaminophen  650 mg  07/02/19 04:52  07/03/19 05:57





  Tylenol  PO   650 mg





  Q4H PRN   Administration





  Pain MILD(1-3)/Fever >100.5/HA  


 


Albuterol  2.5 mg  07/02/19 04:52 





  Proventil  IH  





  Q3HRT PRN  





  Shortness Of Breath  


 


Amlodipine Besylate  10 mg  07/02/19 10:00  07/03/19 09:59





  Norvasc  PO   10 mg





  QDAY ARASH   Administration


 


Aspirin  81 mg  07/02/19 10:00  07/03/19 09:58





  Baby Aspirin  PO   81 mg





  QDAY ARASH   Administration


 


Docusate Sodium  100 mg  07/02/19 10:00  07/03/19 09:58





  Colace  PO   100 mg





  BID ARASH   Administration


 


Furosemide  40 mg  07/02/19 10:00  07/03/19 09:58





  Lasix  IV   40 mg





  DAILY ARASH   Administration


 


Heparin Sodium (Porcine)  5,000 unit  07/02/19 10:00  07/03/19 09:59





  Heparin  SUB-Q   5,000 unit





  Q12HR ARASH   Administration


 


Hydralazine HCl  10 mg  07/02/19 04:57  07/03/19 06:03





  Apresoline  IV   10 mg





  Q4H PRN   Administration





  Blood Pressure  


 


Ondansetron HCl  4 mg  07/02/19 04:52 





  Zofran  IV  





  Q8H PRN  





  Nausea And Vomiting  


 


Oxycodone/Acetaminophen  1 tab  07/02/19 04:52  07/03/19 07:55





  Percocet 5/325  PO   1 tab





  Q6H PRN   Administration





  Pain, Moderate (4-6)  


 


Potassium Chloride  40 meq  07/03/19 08:30  07/03/19 08:40





  Potassium Chloride  FEEDTUBE  07/03/19 14:31  40 meq





  Q6H ARASH   Administration


 


Sodium Chloride  10 ml  07/02/19 10:00  07/03/19 09:59





  Sodium Chloride Flush Syringe 10 Ml  IV   10 ml





  BID ARASH   Administration


 


Sodium Chloride  10 ml  07/02/19 04:52 





  Sodium Chloride Flush Syringe 10 Ml  IV  





  PRN PRN  





  LINE FLUSH  














Nutrition/Malnutrition Assess





- Dietary Evaluation


Nutrition/Malnutrition Findings: 


                                        





Nutrition Notes                                            Start:  07/02/19 

09:38


Freq:                                                      Status: Active       




Protocol:                                                                       




 Document     07/02/19 09:38  LP  (Rec: 07/02/19 09:46  LP  [id number])


 Nutrition Notes


     Need for Assessment generated from:         MD Order


     Initial or Follow up                        Brief Note


     Current Diagnosis                           CKD(stage I-IV),COPD,


                                                 Hypertension,Heart Failure


     Other Pertinent Diagnosis                   Throat CA


     Current Diet                                No diet


     Labs/Tests                                  Na 147


                                                 BUN 29


                                                 Cr 1.8


                                                 Pro BNP 33,486


     Pertinent Medications                       Lasix


     Subjective/Other Information                Consult for malnutrition. Pt


                                                 does not physically appear


                                                 malnourished. Pt sleeping at


                                                 time of visit.


 Nutrition Intervention


     Follow-Up By:                               07/03/19


     Additional Comments                         Follow for diet advancement,


                                                 assessment

## 2019-07-03 NOTE — ULTRASOUND REPORT
RENAL ULTRASOUND



HISTORY: Acute renal failure.



COMPARISON: None.



TECHNIQUE: Multiple real-time ultrasonographic grayscale images were obtained of the kidneys and urin
sherry bladder.



FINDINGS:

Right kidney: The right kidney is echogenic. No evidence for cystic disease, mass, calculus or hydron
ephrosis.  Kidney measures 10.2 x 4.7 x 5.7 cm.

Left kidney: The left kidney is echogenic. There is a large septated cyst at the inferior pole of the
 left kidney measuring up to 6.8 cm in diameter. No evidence for mass, calculus or hydronephrosis. Ki
dney measures 11.9 x 5.7 x 4.9 cm.

Urinary bladder: No significant abnormality.

Additional findings: None.



IMPRESSION:

Echogenic kidneys consistent with nonspecific renal parenchymal disease. No obstructive uropathy.

Complex septated cyst or multiple adjacent cysts at the inferior pole of the left kidney.



Signer Name: Jremain Canchola Jr, MD 

Signed: 7/3/2019 1:40 PM

 Workstation Name: [id number]

## 2019-07-04 LAB
BUN SERPL-MCNC: 19 MG/DL (ref 9–20)
BUN/CREAT SERPL: 17 %
CALCIUM SERPL-MCNC: 8.5 MG/DL (ref 8.4–10.2)
HEMOLYSIS INDEX: 20

## 2019-07-04 RX ADMIN — DOCUSATE SODIUM SCH MG: 100 CAPSULE, LIQUID FILLED ORAL at 11:44

## 2019-07-04 RX ADMIN — DOCUSATE SODIUM SCH MG: 100 CAPSULE, LIQUID FILLED ORAL at 21:27

## 2019-07-04 RX ADMIN — FAMOTIDINE SCH MG: 20 TABLET ORAL at 21:28

## 2019-07-04 RX ADMIN — CARVEDILOL SCH MG: 3.12 TABLET, FILM COATED ORAL at 11:44

## 2019-07-04 RX ADMIN — FAMOTIDINE SCH MG: 20 TABLET ORAL at 11:44

## 2019-07-04 RX ADMIN — HYDRALAZINE HYDROCHLORIDE SCH MG: 25 TABLET, FILM COATED ORAL at 21:27

## 2019-07-04 RX ADMIN — FUROSEMIDE SCH: 10 INJECTION, SOLUTION INTRAVENOUS at 12:54

## 2019-07-04 RX ADMIN — OXYBUTYNIN CHLORIDE SCH MG: 5 TABLET ORAL at 11:43

## 2019-07-04 RX ADMIN — HEPARIN SODIUM SCH: 5000 INJECTION, SOLUTION INTRAVENOUS; SUBCUTANEOUS at 11:54

## 2019-07-04 RX ADMIN — HYDRALAZINE HYDROCHLORIDE SCH: 25 TABLET, FILM COATED ORAL at 12:53

## 2019-07-04 RX ADMIN — FUROSEMIDE SCH MG: 10 INJECTION, SOLUTION INTRAVENOUS at 11:43

## 2019-07-04 RX ADMIN — HYDRALAZINE HYDROCHLORIDE SCH MG: 25 TABLET, FILM COATED ORAL at 14:17

## 2019-07-04 RX ADMIN — HYDRALAZINE HYDROCHLORIDE PRN MG: 20 INJECTION INTRAMUSCULAR; INTRAVENOUS at 14:24

## 2019-07-04 RX ADMIN — FAMOTIDINE SCH MG: 20 TABLET ORAL at 01:21

## 2019-07-04 RX ADMIN — HYDRALAZINE HYDROCHLORIDE SCH: 25 TABLET, FILM COATED ORAL at 14:19

## 2019-07-04 RX ADMIN — ASPIRIN SCH: 81 TABLET, CHEWABLE ORAL at 12:53

## 2019-07-04 RX ADMIN — FUROSEMIDE SCH MG: 10 INJECTION, SOLUTION INTRAVENOUS at 18:36

## 2019-07-04 RX ADMIN — ACETAMINOPHEN PRN MG: 325 TABLET ORAL at 18:35

## 2019-07-04 RX ADMIN — HEPARIN SODIUM SCH: 5000 INJECTION, SOLUTION INTRAVENOUS; SUBCUTANEOUS at 21:29

## 2019-07-04 RX ADMIN — AMLODIPINE BESYLATE SCH MG: 10 TABLET ORAL at 11:44

## 2019-07-04 RX ADMIN — Medication SCH ML: at 21:28

## 2019-07-04 RX ADMIN — ASPIRIN SCH MG: 81 TABLET, CHEWABLE ORAL at 11:43

## 2019-07-04 RX ADMIN — AMLODIPINE BESYLATE SCH: 10 TABLET ORAL at 12:54

## 2019-07-04 RX ADMIN — Medication SCH ML: at 11:45

## 2019-07-04 RX ADMIN — CARVEDILOL SCH MG: 3.12 TABLET, FILM COATED ORAL at 21:28

## 2019-07-04 NOTE — PROGRESS NOTE
Assessment and Plan





1. Acute kidney injury:


Likely Vasomotor JAYDEN in the setting of CHF.


Suspect Cardio-renal syndrome.


Renal US was negative for hydro.


Renal function is better.


Monitor renal function.


Avoid nephrotoxic agents.


Meds dosage based on GFR.





2. FEN:


Volume overlaod, increase Lasix.


Hypernatremia, Na level is better.


Hypokalemia, K level is better.


Monitor lytes.





3. Acute decompensated CHF.





4. Hypertension:


Monitor BP.





5. H/o throat cancer.











Subjective


Date of service: 07/04/19


Interval history: 


Patient was seen and examined at the bedside.








Objective





- Vital Signs


Vital signs: 


                               Vital Signs - 12hr











  07/04/19 07/04/19 07/04/19





  04:19 06:00 07:31


 


Temperature 98.2 F  97.6 F


 


Pulse Rate 59 L 68 77


 


Respiratory 18  18





Rate   


 


Blood Pressure 151/98  170/105


 


O2 Sat by Pulse 98  99





Oximetry   














  07/04/19





  09:56


 


Temperature 


 


Pulse Rate 84


 


Respiratory 





Rate 


 


Blood Pressure 


 


O2 Sat by Pulse 





Oximetry 














- General Appearance


General appearance: well-developed, well-nourished, appears stated age, other 

(no distress)


EENT: ATNC, PERRL, hearing diminished


Neck: JVD, supple


Respiratory: Present: Clear to Ascultation


Cardiology: regular, S1S2, no murmurs


Gastrointestinal: normoactive bowel sounds, no tenderness, no distended


Integumentary: no rash, warm and dry


Neurologic: no focal deficit, no asterixis


Musculoskeletal: other (1 to 2+ LE edema noted)





- Lab





                                 07/03/19 04:43





                                 07/04/19 04:32


                             Most recent lab results











Calcium  8.5 mg/dL (8.4-10.2)   07/04/19  04:32    


 


Magnesium  1.90 mg/dL (1.7-2.3)   07/04/19  04:32    


 


  64.4 mg/dL (0.1-20.0)  H  07/02/19  Unknown


 


  49 mmol/L  07/02/19  Unknown


 


  18 mg/dL (5-11.8)  H  07/02/19  Unknown














Medications & Allergies





- Medications


Allergies/Adverse Reactions: 


                                    Allergies





levofloxacin [From Levaquin] Allergy (Verified 07/02/19 00:40)


   Unknown








Home Medications: 


                                Home Medications











 Medication  Instructions  Recorded  Confirmed  Last Taken  Type


 


Coreg 3.125 mg PO BID 07/02/19 07/02/19 06/30/19 History


 


Furosemide 20 mg PO DAILY 07/02/19 07/02/19 06/30/19 History


 


Oxybutynin 2.5 mg PO DAILY 07/02/19 07/02/19 06/30/19 History


 


Pepcid 20 mg PO BID 07/02/19 07/02/19 06/30/19 History


 


Ranitidine HCl 150 mg PO BID 07/02/19 07/02/19 06/30/19 History


 


hydrALAZINE 50 mg PO TID 07/02/19 07/02/19 06/30/19 History











Active Medications: 














Generic Name Dose Route Start Last Admin





  Trade Name Freq  PRN Reason Stop Dose Admin


 


Acetaminophen  650 mg  07/02/19 04:52  07/03/19 22:05





  Tylenol  PO   650 mg





  Q4H PRN   Administration





  Pain MILD(1-3)/Fever >100.5/HA  


 


Albuterol  2.5 mg  07/02/19 04:52 





  Proventil  IH  





  Q3HRT PRN  





  Shortness Of Breath  


 


Amlodipine Besylate  10 mg  07/02/19 10:00  07/04/19 12:54





  Norvasc  PO   Not Given





  QDAY [facility]  


 


Aspirin  81 mg  07/02/19 10:00  07/04/19 12:53





  Baby Aspirin  PO   Not Given





  QDAY [facility]  


 


Carvedilol  3.125 mg  07/04/19 10:00  07/04/19 11:44





  Coreg  PO   3.125 mg





  BID ARASH   Administration


 


Docusate Sodium  100 mg  07/02/19 10:00  07/04/19 11:44





  Colace  PO   100 mg





  BID ARASH   Administration


 


Famotidine  20 mg  07/04/19 00:45  07/04/19 11:44





  Pepcid  PO   20 mg





  BID ARASH   Administration


 


Furosemide  40 mg  07/02/19 10:00  07/04/19 12:54





  Lasix  IV   Not Given





  DAILY [facility]  


 


Heparin Sodium (Porcine)  5,000 unit  07/02/19 10:00  07/04/19 11:54





  Heparin  SUB-Q   Not Given





  Q12HR [facility]  


 


Hydralazine HCl  10 mg  07/02/19 04:57  07/03/19 13:40





  Apresoline  IV   10 mg





  Q4H PRN   Administration





  Blood Pressure  


 


Hydralazine HCl  50 mg  07/04/19 08:00  07/04/19 12:53





  Apresoline  PO   Not Given





  TID ARASH  


 


Ondansetron HCl  4 mg  07/02/19 04:52 





  Zofran  IV  





  Q8H PRN  





  Nausea And Vomiting  


 


Oxybutynin Chloride  2.5 mg  07/04/19 10:00  07/04/19 11:43





  Ditropan  PO   2.5 mg





  DAILY ARASH   Administration


 


Oxycodone/Acetaminophen  1 tab  07/02/19 04:52  07/03/19 18:30





  Percocet 5/325  PO   1 tab





  Q6H PRN   Administration





  Pain, Moderate (4-6)  


 


Sodium Chloride  10 ml  07/02/19 10:00  07/04/19 11:45





  Sodium Chloride Flush Syringe 10 Ml  IV   10 ml





  BID ARASH   Administration


 


Sodium Chloride  10 ml  07/02/19 04:52 





  Sodium Chloride Flush Syringe 10 Ml  IV  





  PRN PRN  





  LINE FLUSH

## 2019-07-04 NOTE — PROGRESS NOTE
Assessment and Plan


Assessment and plan: 





73-year-old -American male with history of COPD, CHF, HTN, throat cancer 

who presents to [facility] ED with complaints of progressively worsening bilateral 

lower extremity pitting edema for the past 2-3 days.  On examination patient has

bilateral lower extremity 2+ pitting edema.   chest x-ray is suggestive of 

volume overload/CHF exacerbation.


Acute exacerbation of CHF, Bilateral leg swelling


- Patient denied SOB orchest pain


- Will get echo


Hypertensive urgency


- Controlled


- continue current medication regimen


Acute hypoxic respiratory failure


- Patient is saturating well on room air


JAYDEN 


- Creatinine is trending down


Hypokalemia


- repleted, will check BMP now


- magnesium level is normal


Mild to moderate malnutrition


- Nutrition consult


Dysphagia


- Speech therapy evaluated her and recommend [place] consistency


deconditioned


- PT/OT consulted





Disposition; Patient may need SNF placement at discharge. 





History


Interval history: 


Patient was seen and evaluated this morning, difficult to understand because of 

his mumbling sound.








Hospitalist Physical





- Physical exam


Narrative exam: 





 Not in cardiopulmonary distress. 


 The patient appeared well nourished and normally developed.


 Vital signs as documented.


 Head exam is unremarkable.


 No scleral icterus .


 Neck is without jugular venous distension, thyromegaly, or carotid bruits. 


 Lungs are clear to auscultation.


Cardiac exam reveals regular rate and  Rhythm.


Abdominal exam reveals normal bowel sounds, no masses, no organomegaly and no 

aortic enlargement. 


Extremities bilateral lower extremity swelling.


CNS: Alert and oriented 3.  No focal weakness.





- Constitutional


Vitals: 


                                        











Temp Pulse Resp BP Pulse Ox


 


 97.6 F   84   18   170/105   99 


 


 07/04/19 07:31  07/04/19 09:56  07/04/19 07:31  07/04/19 07:31  07/04/19 07:31














Results





- Labs


CBC & Chem 7: 


                                 07/03/19 04:43





                                 07/04/19 04:32


Labs: 


                             Laboratory Last Values











WBC  4.4 K/mm3 (4.5-11.0)  L  07/03/19  04:43    


 


RBC  4.67 M/mm3 (3.65-5.03)   07/03/19  04:43    


 


Hgb  13.1 gm/dl (11.8-15.2)   07/03/19  04:43    


 


Hct  40.4 % (35.5-45.6)   07/03/19  04:43    


 


MCV  87 fl (84-94)   07/03/19  04:43    


 


MCH  28 pg (28-32)   07/03/19  04:43    


 


MCHC  33 % (32-34)   07/03/19  04:43    


 


RDW  20.4 % (13.2-15.2)  H  07/03/19  04:43    


 


Plt Count  309 K/mm3 (140-440)   07/03/19  04:43    


 


Lymph % (Auto)  25.7 % (13.4-35.0)   07/03/19  04:43    


 


Mono % (Auto)  10.7 % (0.0-7.3)  H  07/03/19  04:43    


 


Eos % (Auto)  7.5 % (0.0-4.3)  H  07/03/19  04:43    


 


Baso % (Auto)  1.4 % (0.0-1.8)   07/03/19  04:43    


 


Lymph #  1.1 K/mm3 (1.2-5.4)  L  07/03/19  04:43    


 


Mono #  0.5 K/mm3 (0.0-0.8)   07/03/19  04:43    


 


Eos #  0.3 K/mm3 (0.0-0.4)   07/03/19  04:43    


 


Baso #  0.1 K/mm3 (0.0-0.1)   07/03/19  04:43    


 


Seg Neutrophils %  54.7 % (40.0-70.0)   07/03/19  04:43    


 


Seg Neutrophils #  2.4 K/mm3 (1.8-7.7)   07/03/19  04:43    


 


Sodium  145 mmol/L (137-145)   07/04/19  04:32    


 


Potassium  4.1 mmol/L (3.6-5.0)   07/04/19  04:32    


 


Chloride  106.0 mmol/L ()   07/04/19  04:32    


 


Carbon Dioxide  29 mmol/L (22-30)   07/04/19  04:32    


 


  14 mmol/L  07/04/19  04:32    


 


BUN  19 mg/dL (9-20)   07/04/19  04:32    


 


  1.1 mg/dL (0.8-1.5)   07/04/19  04:32    


 


Estimated GFR  > 60 ml/min  07/04/19  04:32    


 


  17 %  07/04/19  04:32    


 


Glucose  130 mg/dL ()  H  07/04/19  04:32    


 


Calcium  8.5 mg/dL (8.4-10.2)   07/04/19  04:32    


 


Magnesium  1.90 mg/dL (1.7-2.3)   07/04/19  04:32    


 


  1.30 mg/dL (0.1-1.2)  H  07/02/19  01:11    


 


AST  17 units/L (5-40)   07/02/19  01:11    


 


ALT  19 units/L (7-56)   07/02/19  01:11    


 


  172 units/L ()  H  07/02/19  01:11    


 


  64 units/L ()   07/02/19  01:11    


 


CK-MB (CK-2)  1.8 ng/mL (0.0-4.0)   07/02/19  01:11    


 


CK-MB (CK-2) Rel Index  2.8  (0-4)   07/02/19  01:11    


 


  0.014 ng/mL (0.00-0.029)   07/02/19  01:11    


 


NT-Pro-B Natriuret Pep  98992 pg/mL (0-900)  H  07/02/19  01:11    


 


  6.4 g/dL (6.3-8.2)   07/02/19  01:11    


 


  3.2 g/dL (3.9-5)  L  07/02/19  01:11    


 


  1.0 %  07/02/19  01:11    


 


PTH Intact  232.5 pg/mL (15-65)  H  07/03/19  04:43    


 


  Yellow  (Yellow)   07/02/19  Unknown


 


  Clear  (Clear)   07/02/19  Unknown


 


  6.0  (5.0-7.0)   07/02/19  Unknown


 


Ur Specific Gravity  1.012  (1.003-1.030)   07/02/19  Unknown


 


  <15 mg/dl mg/dL (Negative)   07/02/19  Unknown


 


  Neg mg/dL (Negative)   07/02/19  Unknown


 


  Neg mg/dL (Negative)   07/02/19  Unknown


 


  Neg  (Negative)   07/02/19  Unknown


 


  Neg  (Negative)   07/02/19  Unknown


 


  Neg  (Negative)   07/02/19  Unknown


 


  4.0 mg/dL (<2.0)   07/02/19  Unknown


 


Ur Leukocyte Esterase  Neg  (Negative)   07/02/19  Unknown


 


  < 1.0 /HPF (0.0-6.0)   07/02/19  Unknown


 


  1.0 /HPF (0.0-6.0)   07/02/19  Unknown


 


  64.4 mg/dL (0.1-20.0)  H  07/02/19  Unknown


 


Protein/Creatinin Ratio  0.29   07/02/19  Unknown


 


  49 mmol/L  07/02/19  Unknown


 


  18 mg/dL (5-11.8)  H  07/02/19  Unknown


 


Plasma/Serum Alcohol  < 0.01 % (0-0.07)   07/02/19  01:11    














Active Medications





- Current Medications


Current Medications: 














Generic Name Dose Route Start Last Admin





  Trade Name Freq  PRN Reason Stop Dose Admin


 


Acetaminophen  650 mg  07/02/19 04:52  07/03/19 22:05





  Tylenol  PO   650 mg





  Q4H PRN   Administration





  Pain MILD(1-3)/Fever >100.5/HA  


 


Albuterol  2.5 mg  07/02/19 04:52 





  Proventil  IH  





  Q3HRT PRN  





  Shortness Of Breath  


 


Amlodipine Besylate  10 mg  07/02/19 10:00  07/03/19 09:59





  Norvasc  PO   10 mg





  QDAY ARASH   Administration


 


Aspirin  81 mg  07/02/19 10:00  07/03/19 09:58





  Baby Aspirin  PO   81 mg





  QDAY ARASH   Administration


 


Carvedilol  3.125 mg  07/04/19 10:00 





  Coreg  PO  





  BID [facility]  


 


Docusate Sodium  100 mg  07/02/19 10:00  07/03/19 22:05





  Colace  PO   100 mg





  BID ARASH   Administration


 


Famotidine  20 mg  07/04/19 00:45  07/04/19 01:21





  Pepcid  PO   20 mg





  BID ARASH   Administration


 


Furosemide  40 mg  07/02/19 10:00  07/03/19 09:58





  Lasix  IV   40 mg





  DAILY ARASH   Administration


 


Heparin Sodium (Porcine)  5,000 unit  07/02/19 10:00  07/03/19 22:07





  Heparin  SUB-Q   Not Given





  Q12HR [facility]  


 


Hydralazine HCl  10 mg  07/02/19 04:57  07/03/19 13:40





  Apresoline  IV   10 mg





  Q4H PRN   Administration





  Blood Pressure  


 


Hydralazine HCl  50 mg  07/04/19 08:00 





  Apresoline  PO  





  TID ARASH  


 


Ondansetron HCl  4 mg  07/02/19 04:52 





  Zofran  IV  





  Q8H PRN  





  Nausea And Vomiting  


 


Oxybutynin Chloride  2.5 mg  07/04/19 10:00 





  Ditropan  PO  





  DAILY ARASH  


 


Oxycodone/Acetaminophen  1 tab  07/02/19 04:52  07/03/19 18:30





  Percocet 5/325  PO   1 tab





  Q6H PRN   Administration





  Pain, Moderate (4-6)  


 


Sodium Chloride  10 ml  07/02/19 10:00  07/03/19 22:07





  Sodium Chloride Flush Syringe 10 Ml  IV   10 ml





  BID ARASH   Administration


 


Sodium Chloride  10 ml  07/02/19 04:52 





  Sodium Chloride Flush Syringe 10 Ml  IV  





  PRN PRN  





  LINE FLUSH  














Nutrition/Malnutrition Assess





- Dietary Evaluation


Nutrition/Malnutrition Findings: 


                                        





Nutrition Notes                                            Start:  07/02/19 

09:38


Freq:                                                      Status: Active       




Protocol:                                                                       




 Document     07/03/19 16:17  RM  (Rec: 07/03/19 16:19  RM  [id number])


 Nutrition Notes


     Initial or Follow up                        Brief Note


     Current Diagnosis                           CKD(stage I-IV),COPD,


                                                 Hypertension,Heart Failure


     Other Pertinent Diagnosis                   Throat CA


     Current Diet                                Pureed


     Height                                      5 ft 7 in


     Weight                                      88.6 kg


     Ideal Body Weight (kg)                      67.27


     BMI                                         30.6


     Subjective/Other Information                Pt stated that his appetite is


                                                 good and that he eats all of


                                                 his meals. Stated that he did


                                                 not receive lunch today and


                                                 was requesting meal to be


                                                 brought at time of visit.


                                                 Writer spoke w/nurse who


                                                 stated that tech was going to


                                                 kitchen to get tray for pt.


 Nutrition Intervention


     Revisit per MD consult or patient           Sign Off


      request:

## 2019-07-05 LAB
BUN SERPL-MCNC: 15 MG/DL (ref 9–20)
BUN/CREAT SERPL: 15 %
CALCIUM SERPL-MCNC: 8.8 MG/DL (ref 8.4–10.2)
HEMOLYSIS INDEX: 1

## 2019-07-05 RX ADMIN — Medication SCH ML: at 10:04

## 2019-07-05 RX ADMIN — Medication SCH ML: at 21:35

## 2019-07-05 RX ADMIN — HEPARIN SODIUM SCH: 5000 INJECTION, SOLUTION INTRAVENOUS; SUBCUTANEOUS at 10:04

## 2019-07-05 RX ADMIN — DOCUSATE SODIUM SCH MG: 100 CAPSULE, LIQUID FILLED ORAL at 21:28

## 2019-07-05 RX ADMIN — ACETAMINOPHEN PRN MG: 325 TABLET ORAL at 03:56

## 2019-07-05 RX ADMIN — HYDRALAZINE HYDROCHLORIDE SCH MG: 25 TABLET, FILM COATED ORAL at 21:27

## 2019-07-05 RX ADMIN — DOCUSATE SODIUM SCH: 100 CAPSULE, LIQUID FILLED ORAL at 10:08

## 2019-07-05 RX ADMIN — DOCUSATE SODIUM SCH MG: 100 CAPSULE, LIQUID FILLED ORAL at 10:03

## 2019-07-05 RX ADMIN — HYDRALAZINE HYDROCHLORIDE SCH MG: 25 TABLET, FILM COATED ORAL at 10:03

## 2019-07-05 RX ADMIN — FAMOTIDINE SCH MG: 20 TABLET ORAL at 21:28

## 2019-07-05 RX ADMIN — HEPARIN SODIUM SCH: 5000 INJECTION, SOLUTION INTRAVENOUS; SUBCUTANEOUS at 21:29

## 2019-07-05 RX ADMIN — HYDRALAZINE HYDROCHLORIDE SCH MG: 25 TABLET, FILM COATED ORAL at 13:40

## 2019-07-05 RX ADMIN — CARVEDILOL SCH MG: 3.12 TABLET, FILM COATED ORAL at 10:04

## 2019-07-05 RX ADMIN — ASPIRIN SCH: 81 TABLET, CHEWABLE ORAL at 10:04

## 2019-07-05 RX ADMIN — AMLODIPINE BESYLATE SCH: 10 TABLET ORAL at 10:04

## 2019-07-05 RX ADMIN — FUROSEMIDE SCH MG: 10 INJECTION, SOLUTION INTRAVENOUS at 05:51

## 2019-07-05 RX ADMIN — OXYBUTYNIN CHLORIDE SCH MG: 5 TABLET ORAL at 10:03

## 2019-07-05 RX ADMIN — CARVEDILOL SCH MG: 3.12 TABLET, FILM COATED ORAL at 21:27

## 2019-07-05 RX ADMIN — FAMOTIDINE SCH MG: 20 TABLET ORAL at 10:03

## 2019-07-05 NOTE — CONSULTATION
History of Present Illness


Consult date: 07/05/19


Consult reason: congestive heart failure


History of present illness: 





73 year old male presenting with LE swelling. Echo pertinent for 4 chamber d

ilatation and severe global hypokinesis, LVEF 10-15%. Patient is a poor 

historian. He is very difficult to understand. He has a history of throat 

cancer. He denies PPM or AICD. He is not following with any cardiologist for the

time being. He has excessive upper respiratory secretions.





Past History


Past Medical History: cancer (throat cancer), COPD, heart failure, hypertension


Past Surgical History: No surgical history


Social history: no significant social history


Family history: no significant family history





Medications and Allergies


                                    Allergies











Allergy/AdvReac Type Severity Reaction Status Date / Time


 


levofloxacin [From [employer]] Allergy  Unknown Verified 07/02/19 00:40











                                Home Medications











 Medication  Instructions  Recorded  Confirmed  Last Taken  Type


 


Coreg 3.125 mg PO BID 07/02/19 07/02/19 06/30/19 History


 


Furosemide 20 mg PO DAILY 07/02/19 07/02/19 06/30/19 History


 


Oxybutynin 2.5 mg PO DAILY 07/02/19 07/02/19 06/30/19 History


 


Pepcid 20 mg PO BID 07/02/19 07/02/19 06/30/19 History


 


Ranitidine HCl 150 mg PO BID 07/02/19 07/02/19 06/30/19 History


 


hydrALAZINE 50 mg PO TID 07/02/19 07/02/19 06/30/19 History











Active Meds: 


Active Medications





Acetaminophen (Tylenol)  650 mg PO Q4H PRN


   PRN Reason: Pain MILD(1-3)/Fever >100.5/HA


   Last Admin: 07/05/19 03:56 Dose:  650 mg


   Documented by: 


Albuterol (Proventil)  2.5 mg IH Q3HRT PRN


   PRN Reason: Shortness Of Breath


Amlodipine Besylate (Norvasc)  10 mg PO QDAY [facility]


   Last Admin: 07/05/19 10:04 Dose:  Not Given


   Documented by: 


Aspirin (Baby Aspirin)  81 mg PO QDAY [facility]


   Last Admin: 07/05/19 10:04 Dose:  Not Given


   Documented by: 


Carvedilol (Coreg)  3.125 mg PO BID [facility]


   Last Admin: 07/05/19 10:04 Dose:  3.125 mg


   Documented by: 


Docusate Sodium (Colace)  100 mg PO BID [facility]


   Last Admin: 07/05/19 10:08 Dose:  Not Given


   Documented by: 


Famotidine (Pepcid)  20 mg PO BID [facility]


   Last Admin: 07/05/19 10:03 Dose:  20 mg


   Documented by: 


Furosemide (Lasix)  40 mg IV 0600,1800 [facility]


   Last Admin: 07/05/19 05:51 Dose:  40 mg


   Documented by: 


Heparin Sodium (Porcine) (Heparin)  5,000 unit SUB-Q Q12HR [facility]


   Last Admin: 07/05/19 10:04 Dose:  Not Given


   Documented by: 


Hydralazine HCl (Apresoline)  10 mg IV Q4H PRN


   PRN Reason: Blood Pressure


   Last Admin: 07/04/19 14:24 Dose:  10 mg


   Documented by: 


Hydralazine HCl (Apresoline)  50 mg PO TID [facility]


   Last Admin: 07/05/19 10:03 Dose:  50 mg


   Documented by: 


Ondansetron HCl (Zofran)  4 mg IV Q8H PRN


   PRN Reason: Nausea And Vomiting


   Last Admin: 07/05/19 05:51 Dose:  4 mg


   Documented by: 


Oxybutynin Chloride (Ditropan)  2.5 mg PO DAILY [facility]


   Last Admin: 07/05/19 10:03 Dose:  2.5 mg


   Documented by: 


Oxycodone/Acetaminophen (Percocet 5/325)  1 tab PO Q6H PRN


   PRN Reason: Pain, Moderate (4-6)


   Last Admin: 07/03/19 18:30 Dose:  1 tab


   Documented by: 


Sodium Chloride (Sodium Chloride Flush Syringe 10 Ml)  10 ml IV BID [facility]


   Last Admin: 07/05/19 10:04 Dose:  10 ml


   Documented by: 


Sodium Chloride (Sodium Chloride Flush Syringe 10 Ml)  10 ml IV PRN PRN


   PRN Reason: LINE FLUSH











Review of Systems


All systems: negative





Physical Examination


                                   Vital Signs











Temp Pulse Resp BP Pulse Ox


 


 97.9 F   60   12   155/77   100 


 


 07/02/19 00:15  07/02/19 00:15  07/02/19 00:15  07/02/19 00:15  07/02/19 00:15











General appearance: no acute distress


HEENT: Positive: PERRL


Neck: Positive: JVD/HJR


Cardiac: Positive: Reg Rate and Rhythm


Lungs: Positive: Decreased Breath Sounds, Rales


Abdomen: Positive: Soft


Extremities: Present: edema





Results





                                 07/03/19 04:43





                                 07/05/19 04:20


                          Comprehensive Metabolic Panel











  07/05/19 Range/Units





  04:20 


 


Sodium  143  (137-145)  mmol/L


 


Potassium  3.8  (3.6-5.0)  mmol/L


 


Chloride  103.4  ()  mmol/L


 


Carbon Dioxide  31 H  (22-30)  mmol/L


 


BUN  15  (9-20)  mg/dL


 


Creatinine  1.0  (0.8-1.5)  mg/dL


 


Glucose  104 H  ()  mg/dL


 


Calcium  8.8  (8.4-10.2)  mg/dL














- EKG Interpretation


EKG: sinus rhythm


EKG shows: sinus rhythm





EKG interpretations





- Telemetry


EKG Rhythm: Sinus Rhythm





Assessment and Plan





Acute on chronic systolic heart failure


   Patient is warm and wet on exam


   Patient takes torsemide 20 mg three times daily as outpatient


Cardiomyopathy


   Echo showing 4 chamber dilatation with LVEF 10-15%


Acute renal failure - resolved


Systemic Hypertension


COPD


Throat cancer





Recommendations:





Continue current medical therapy


No need for inotropic support

## 2019-07-05 NOTE — PROGRESS NOTE
Assessment and Plan





1. Acute kidney injury:


Likely Vasomotor JAYDEN in the setting of CHF.


Suspect Cardio-renal syndrome.


Renal US was negative for hydro.


Renal function is better.


Monitor renal function.


Avoid nephrotoxic agents.


Meds dosage based on GFR.





2. FEN:


Volume overlaod, continue Lasix.


Hypernatremia, Na level is better.


Hypokalemia, K level is better.


Metabolic alkalosis, Diamox prn.


Monitor lytes.





3. Acute decompensated CHF.





4. Hypertension:


Monitor BP.





5. H/o throat cancer.











Subjective


Date of service: 07/05/19


Interval history: 


Patient was seen and examined at the bedside.








Objective





- Vital Signs


Vital signs: 


                               Vital Signs - 12hr











  07/05/19 07/05/19 07/05/19





  03:56 04:56 04:59


 


Temperature   97.7 F


 


Pulse Rate   72


 


Respiratory 20 18 18





Rate   


 


Blood Pressure   154/91


 


O2 Sat by Pulse   96





Oximetry   














  07/05/19 07/05/19 07/05/19





  07:00 10:00 11:39


 


Temperature 97.7 F  98.6 F


 


Pulse Rate 66 65 58 L


 


Respiratory 20  18





Rate   


 


Blood Pressure 122/50  141/78


 


O2 Sat by Pulse 95  95





Oximetry   














- General Appearance


General appearance: well-developed, appears stated age, other (no distress)


EENT: ATNC, PERRL, vision intact


Neck: supple


Respiratory: Present: Rales


Cardiology: regular, S1S2, no murmurs


Gastrointestinal: normoactive bowel sounds, no tenderness, no distended


Integumentary: no rash, warm and dry


Neurologic: no asterixis, alert and oriented x3, other (mumbling)


Musculoskeletal: other (2+ edema of both LEs noted)





- Lab





                                 07/03/19 04:43





                                 07/05/19 04:20


                             Most recent lab results











Calcium  8.8 mg/dL (8.4-10.2)   07/05/19  04:20    


 


Magnesium  1.90 mg/dL (1.7-2.3)   07/04/19  04:32    


 


  64.4 mg/dL (0.1-20.0)  H  07/02/19  Unknown


 


  49 mmol/L  07/02/19  Unknown


 


  18 mg/dL (5-11.8)  H  07/02/19  Unknown














Medications & Allergies





- Medications


Allergies/Adverse Reactions: 


                                    Allergies





levofloxacin [From Levaquin] Allergy (Verified 07/02/19 00:40)


   Unknown








Home Medications: 


                                Home Medications











 Medication  Instructions  Recorded  Confirmed  Last Taken  Type


 


Coreg 3.125 mg PO BID 07/02/19 07/02/19 06/30/19 History


 


Furosemide 20 mg PO DAILY 07/02/19 07/02/19 06/30/19 History


 


Oxybutynin 2.5 mg PO DAILY 07/02/19 07/02/19 06/30/19 History


 


Pepcid 20 mg PO BID 07/02/19 07/02/19 06/30/19 History


 


Ranitidine HCl 150 mg PO BID 07/02/19 07/02/19 06/30/19 History


 


hydrALAZINE 50 mg PO TID 07/02/19 07/02/19 06/30/19 History











Active Medications: 














Generic Name Dose Route Start Last Admin





  Trade Name Freq  PRN Reason Stop Dose Admin


 


Acetaminophen  650 mg  07/02/19 04:52  07/05/19 03:56





  Tylenol  PO   650 mg





  Q4H PRN   Administration





  Pain MILD(1-3)/Fever >100.5/HA  


 


Albuterol  2.5 mg  07/02/19 04:52 





  Proventil  IH  





  Q3HRT PRN  





  Shortness Of Breath  


 


Amlodipine Besylate  10 mg  07/02/19 10:00  07/05/19 10:04





  Norvasc  PO   Not Given





  QDAY [facility]  


 


Aspirin  81 mg  07/02/19 10:00  07/05/19 10:04





  Baby Aspirin  PO   Not Given





  QDAY [facility]  


 


Carvedilol  3.125 mg  07/04/19 10:00  07/05/19 10:04





  Coreg  PO   3.125 mg





  BID ARASH   Administration


 


Docusate Sodium  100 mg  07/02/19 10:00  07/05/19 10:08





  Colace  PO   Not Given





  BID [facility]ASH  


 


Famotidine  20 mg  07/04/19 00:45  07/05/19 10:03





  Pepcid  PO   20 mg





  BID ARASH   Administration


 


Furosemide  40 mg  07/04/19 18:00  07/05/19 05:51





  Lasix  IV   40 mg





  0600,1800 ARASH   Administration


 


Heparin Sodium (Porcine)  5,000 unit  07/02/19 10:00  07/05/19 10:04





  Heparin  SUB-Q   Not Given





  Q12HR [facility]ASH  


 


Hydralazine HCl  10 mg  07/02/19 04:57  07/04/19 14:24





  Apresoline  IV   10 mg





  Q4H PRN   Administration





  Blood Pressure  


 


Hydralazine HCl  50 mg  07/04/19 08:00  07/05/19 10:03





  Apresoline  PO   50 mg





  TID ARASH   Administration


 


Ondansetron HCl  4 mg  07/02/19 04:52  07/05/19 05:51





  Zofran  IV   4 mg





  Q8H PRN   Administration





  Nausea And Vomiting  


 


Oxybutynin Chloride  2.5 mg  07/04/19 10:00  07/05/19 10:03





  Ditropan  PO   2.5 mg





  DAILY ARASH   Administration


 


Oxycodone/Acetaminophen  1 tab  07/02/19 04:52  07/03/19 18:30





  Percocet 5/325  PO   1 tab





  Q6H PRN   Administration





  Pain, Moderate (4-6)  


 


Sodium Chloride  10 ml  07/02/19 10:00  07/05/19 10:04





  Sodium Chloride Flush Syringe 10 Ml  IV   10 ml





  BID ARASH   Administration


 


Sodium Chloride  10 ml  07/02/19 04:52 





  Sodium Chloride Flush Syringe 10 Ml  IV  





  PRN PRN  





  LINE FLUSH

## 2019-07-05 NOTE — PROGRESS NOTE
Assessment and Plan


Assessment and plan: 





73-year-old -American male with history of COPD, CHF, HTN, throat cancer 

who presents to [facility] ED with complaints of progressively worsening bilateral 

lower extremity pitting edema for the past 2-3 days.  On examination patient has

bilateral lower extremity 2+ pitting edema.   chest x-ray is suggestive of 

volume overload/CHF exacerbation.


Acute exacerbation of CHF, Bilateral leg swelling


- Patient denied SOB orchest pain


- Echo showed EF 10-15%


- Cardiology consulted


Hypertensive urgency


- Controlled


- continue current medication regimen


Acute hypoxic respiratory failure


- Patient is saturating well on room air


JAYDEN 


- Creatinine is trending down


- Resolved


Hypokalemia


- replete and normal


Mild to moderate malnutrition


- Nutrition consult


Dysphagia


- Speech therapy evaluated her and recommend [place] consistency


deconditioned


- PT/OT consulted





Disposition; continue inpatient care 





History


Interval history: 


Patient was seen and evaluated this morning, difficult to understand because of 

his speech is not clear, throat  cancer.








Hospitalist Physical





- Physical exam


Narrative exam: 





 Not in cardiopulmonary distress. 


 The patient appeared well nourished and normally developed.


 Vital signs as documented.


 Head exam is unremarkable.


 No scleral icterus .


 Neck is without jugular venous distension, thyromegaly, or carotid bruits. 


 Lungs are clear to auscultation.


Cardiac exam reveals regular rate and  Rhythm.


Abdominal exam reveals normal bowel sounds, no masses, no organomegaly and no 

aortic enlargement. 


Extremities bilateral lower extremity swelling.


CNS: Alert and oriented 3.  No focal weakness.





- Constitutional


Vitals: 


                                        











Temp Pulse Resp BP Pulse Ox


 


 98.6 F   58 L  18   141/78   95 


 


 07/05/19 11:39  07/05/19 11:39  07/05/19 11:39  07/05/19 11:39  07/05/19 11:39











General appearance: Present: no acute distress





Results





- Labs


CBC & Chem 7: 


                                 07/03/19 04:43





                                 07/05/19 04:20


Labs: 


                             Laboratory Last Values











WBC  4.4 K/mm3 (4.5-11.0)  L  07/03/19  04:43    


 


RBC  4.67 M/mm3 (3.65-5.03)   07/03/19  04:43    


 


Hgb  13.1 gm/dl (11.8-15.2)   07/03/19  04:43    


 


Hct  40.4 % (35.5-45.6)   07/03/19  04:43    


 


MCV  87 fl (84-94)   07/03/19  04:43    


 


MCH  28 pg (28-32)   07/03/19  04:43    


 


MCHC  33 % (32-34)   07/03/19  04:43    


 


RDW  20.4 % (13.2-15.2)  H  07/03/19  04:43    


 


Plt Count  309 K/mm3 (140-440)   07/03/19  04:43    


 


Lymph % (Auto)  25.7 % (13.4-35.0)   07/03/19  04:43    


 


Mono % (Auto)  10.7 % (0.0-7.3)  H  07/03/19  04:43    


 


Eos % (Auto)  7.5 % (0.0-4.3)  H  07/03/19  04:43    


 


Baso % (Auto)  1.4 % (0.0-1.8)   07/03/19  04:43    


 


Lymph #  1.1 K/mm3 (1.2-5.4)  L  07/03/19  04:43    


 


Mono #  0.5 K/mm3 (0.0-0.8)   07/03/19  04:43    


 


Eos #  0.3 K/mm3 (0.0-0.4)   07/03/19  04:43    


 


Baso #  0.1 K/mm3 (0.0-0.1)   07/03/19  04:43    


 


Seg Neutrophils %  54.7 % (40.0-70.0)   07/03/19  04:43    


 


Seg Neutrophils #  2.4 K/mm3 (1.8-7.7)   07/03/19  04:43    


 


Sodium  143 mmol/L (137-145)   07/05/19  04:20    


 


Potassium  3.8 mmol/L (3.6-5.0)   07/05/19  04:20    


 


Chloride  103.4 mmol/L ()   07/05/19  04:20    


 


Carbon Dioxide  31 mmol/L (22-30)  H  07/05/19  04:20    


 


  12 mmol/L  07/05/19  04:20    


 


BUN  15 mg/dL (9-20)   07/05/19  04:20    


 


  1.0 mg/dL (0.8-1.5)   07/05/19  04:20    


 


Estimated GFR  > 60 ml/min  07/05/19  04:20    


 


  15 %  07/05/19  04:20    


 


Glucose  104 mg/dL ()  H  07/05/19  04:20    


 


Calcium  8.8 mg/dL (8.4-10.2)   07/05/19  04:20    


 


Magnesium  1.90 mg/dL (1.7-2.3)   07/04/19  04:32    


 


  1.30 mg/dL (0.1-1.2)  H  07/02/19  01:11    


 


AST  17 units/L (5-40)   07/02/19  01:11    


 


ALT  19 units/L (7-56)   07/02/19  01:11    


 


  172 units/L ()  H  07/02/19  01:11    


 


  64 units/L ()   07/02/19  01:11    


 


CK-MB (CK-2)  1.8 ng/mL (0.0-4.0)   07/02/19  01:11    


 


CK-MB (CK-2) Rel Index  2.8  (0-4)   07/02/19  01:11    


 


  0.014 ng/mL (0.00-0.029)   07/02/19  01:11    


 


NT-Pro-B Natriuret Pep  80772 pg/mL (0-900)  H  07/02/19  01:11    


 


  6.4 g/dL (6.3-8.2)   07/02/19  01:11    


 


  3.2 g/dL (3.9-5)  L  07/02/19  01:11    


 


  1.0 %  07/02/19  01:11    


 


PTH Intact  232.5 pg/mL (15-65)  H  07/03/19  04:43    


 


  Yellow  (Yellow)   07/02/19  Unknown


 


  Clear  (Clear)   07/02/19  Unknown


 


  6.0  (5.0-7.0)   07/02/19  Unknown


 


Ur Specific Gravity  1.012  (1.003-1.030)   07/02/19  Unknown


 


  <15 mg/dl mg/dL (Negative)   07/02/19  Unknown


 


  Neg mg/dL (Negative)   07/02/19  Unknown


 


  Neg mg/dL (Negative)   07/02/19  Unknown


 


  Neg  (Negative)   07/02/19  Unknown


 


  Neg  (Negative)   07/02/19  Unknown


 


  Neg  (Negative)   07/02/19  Unknown


 


  4.0 mg/dL (<2.0)   07/02/19  Unknown


 


Ur Leukocyte Esterase  Neg  (Negative)   07/02/19  Unknown


 


  < 1.0 /HPF (0.0-6.0)   07/02/19  Unknown


 


  1.0 /HPF (0.0-6.0)   07/02/19  Unknown


 


  64.4 mg/dL (0.1-20.0)  H  07/02/19  Unknown


 


Protein/Creatinin Ratio  0.29   07/02/19  Unknown


 


  49 mmol/L  07/02/19  Unknown


 


  18 mg/dL (5-11.8)  H  07/02/19  Unknown


 


Plasma/Serum Alcohol  < 0.01 % (0-0.07)   07/02/19  01:11    














Active Medications





- Current Medications


Current Medications: 














Generic Name Dose Route Start Last Admin





  Trade Name Freq  PRN Reason Stop Dose Admin


 


Acetaminophen  650 mg  07/02/19 04:52  07/05/19 03:56





  Tylenol  PO   650 mg





  Q4H PRN   Administration





  Pain MILD(1-3)/Fever >100.5/HA  


 


Albuterol  2.5 mg  07/02/19 04:52 





  Proventil  IH  





  Q3HRT PRN  





  Shortness Of Breath  


 


Amlodipine Besylate  10 mg  07/02/19 10:00  07/05/19 10:04





  Norvasc  PO   Not Given





  QDAY [facility]  


 


Aspirin  81 mg  07/02/19 10:00  07/05/19 10:04





  Baby Aspirin  PO   Not Given





  QDAY ARASH  


 


Carvedilol  3.125 mg  07/04/19 10:00  07/05/19 10:04





  Coreg  PO   3.125 mg





  BID ARASH   Administration


 


Docusate Sodium  100 mg  07/02/19 10:00  07/05/19 10:08





  Colace  PO   Not Given





  BID ARASH  


 


Famotidine  20 mg  07/04/19 00:45  07/05/19 10:03





  Pepcid  PO   20 mg





  BID ARASH   Administration


 


Furosemide  40 mg  07/04/19 18:00  07/05/19 05:51





  Lasix  IV   40 mg





  0600,1800 ARASH   Administration


 


Heparin Sodium (Porcine)  5,000 unit  07/02/19 10:00  07/05/19 10:04





  Heparin  SUB-Q   Not Given





  Q12HR ARASH  


 


Hydralazine HCl  10 mg  07/02/19 04:57  07/04/19 14:24





  Apresoline  IV   10 mg





  Q4H PRN   Administration





  Blood Pressure  


 


Hydralazine HCl  50 mg  07/04/19 08:00  07/05/19 10:03





  Apresoline  PO   50 mg





  TID ARASH   Administration


 


Ondansetron HCl  4 mg  07/02/19 04:52  07/05/19 05:51





  Zofran  IV   4 mg





  Q8H PRN   Administration





  Nausea And Vomiting  


 


Oxybutynin Chloride  2.5 mg  07/04/19 10:00  07/05/19 10:03





  Ditropan  PO   2.5 mg





  DAILY ARASH   Administration


 


Oxycodone/Acetaminophen  1 tab  07/02/19 04:52  07/03/19 18:30





  Percocet 5/325  PO   1 tab





  Q6H PRN   Administration





  Pain, Moderate (4-6)  


 


Sodium Chloride  10 ml  07/02/19 10:00  07/05/19 10:04





  Sodium Chloride Flush Syringe 10 Ml  IV   10 ml





  BID ARASH   Administration


 


Sodium Chloride  10 ml  07/02/19 04:52 





  Sodium Chloride Flush Syringe 10 Ml  IV  





  PRN PRN  





  LINE FLUSH  














Nutrition/Malnutrition Assess





- Dietary Evaluation


Nutrition/Malnutrition Findings: 


                                        





Nutrition Notes                                            Start:  07/02/19 

09:38


Freq:                                                      Status: Active       




Protocol:                                                                       




 Document     07/03/19 16:17  RM  (Rec: 07/03/19 16:19  RM  [id number])


 Nutrition Notes


     Initial or Follow up                        Brief Note


     Current Diagnosis                           CKD(stage I-IV),COPD,


                                                 Hypertension,Heart Failure


     Other Pertinent Diagnosis                   Throat CA


     Current Diet                                Pureed


     Height                                      5 ft 7 in


     Weight                                      88.6 kg


     Ideal Body Weight (kg)                      67.27


     BMI                                         30.6


     Subjective/Other Information                Pt stated that his appetite is


                                                 good and that he eats all of


                                                 his meals. Stated that he did


                                                 not receive lunch today and


                                                 was requesting meal to be


                                                 brought at time of visit.


                                                 Writer spoke w/nurse who


                                                 stated that tech was going to


                                                 kitchen to get tray for pt.


 Nutrition Intervention


     Revisit per MD consult or patient           Sign Off


      request:

## 2019-07-06 RX ADMIN — HYDRALAZINE HYDROCHLORIDE SCH MG: 25 TABLET, FILM COATED ORAL at 17:37

## 2019-07-06 RX ADMIN — DOCUSATE SODIUM SCH: 100 CAPSULE, LIQUID FILLED ORAL at 21:41

## 2019-07-06 RX ADMIN — FAMOTIDINE SCH MG: 20 TABLET ORAL at 21:40

## 2019-07-06 RX ADMIN — FUROSEMIDE SCH: 10 INJECTION, SOLUTION INTRAVENOUS at 01:48

## 2019-07-06 RX ADMIN — FUROSEMIDE SCH MG: 10 INJECTION, SOLUTION INTRAVENOUS at 06:09

## 2019-07-06 RX ADMIN — Medication SCH ML: at 10:36

## 2019-07-06 RX ADMIN — HYDRALAZINE HYDROCHLORIDE SCH MG: 25 TABLET, FILM COATED ORAL at 10:34

## 2019-07-06 RX ADMIN — ASPIRIN SCH: 81 TABLET, CHEWABLE ORAL at 10:35

## 2019-07-06 RX ADMIN — HEPARIN SODIUM SCH: 5000 INJECTION, SOLUTION INTRAVENOUS; SUBCUTANEOUS at 21:41

## 2019-07-06 RX ADMIN — OXYBUTYNIN CHLORIDE SCH MG: 5 TABLET ORAL at 10:35

## 2019-07-06 RX ADMIN — HYDRALAZINE HYDROCHLORIDE SCH MG: 25 TABLET, FILM COATED ORAL at 20:02

## 2019-07-06 RX ADMIN — CARVEDILOL SCH MG: 3.12 TABLET, FILM COATED ORAL at 10:34

## 2019-07-06 RX ADMIN — HEPARIN SODIUM SCH: 5000 INJECTION, SOLUTION INTRAVENOUS; SUBCUTANEOUS at 10:36

## 2019-07-06 RX ADMIN — FUROSEMIDE SCH MG: 10 INJECTION, SOLUTION INTRAVENOUS at 17:38

## 2019-07-06 RX ADMIN — CARVEDILOL SCH MG: 3.12 TABLET, FILM COATED ORAL at 21:40

## 2019-07-06 RX ADMIN — Medication SCH ML: at 21:48

## 2019-07-06 RX ADMIN — AMLODIPINE BESYLATE SCH MG: 10 TABLET ORAL at 10:33

## 2019-07-06 RX ADMIN — FAMOTIDINE SCH MG: 20 TABLET ORAL at 10:34

## 2019-07-06 RX ADMIN — DOCUSATE SODIUM SCH: 100 CAPSULE, LIQUID FILLED ORAL at 10:35

## 2019-07-06 NOTE — PROGRESS NOTE
Assessment and Plan


Assessment and plan: 





73-year-old -American male with history of COPD, CHF, HTN, throat cancer 

who presents to [facility] ED with complaints of progressively worsening bilateral 

lower extremity pitting edema for the past 2-3 days.  On examination patient has

bilateral lower extremity 2+ pitting edema.   chest x-ray is suggestive of 

volume overload/CHF exacerbation.





Acute on Chronic combined systolic and diastolic heart failure


- Echo showed Dilated cardiomyopathy left ventricular ejection fraction EF 10-

15%


- Cardiology following





Hypertensive urgency


- Controlled


- continue current medication regimen





Acute hypoxic respiratory failure


- Patient is saturating well on room air





Chronic obstructive pulmonary disease


-Compensated





JAYDEN 


- Creatinine is trending down


- Resolved





Hypokalemia


- replete and normal





Mild to moderate malnutrition


- Nutrition consult





Dysphagia


- Speech therapy evaluated her and recommend [place] consistency





Deconditioned


- PT/OT consulted





Disposition; continue inpatient care 





History


Interval history: 





No new issues





Hospitalist Physical





- Constitutional


Vitals: 


                                        











Temp Pulse Resp BP Pulse Ox


 


 98.0 F   65   20   153/82   95 


 


 07/06/19 07:25  07/06/19 10:33  07/06/19 07:25  07/06/19 10:33  07/06/19 07:25











General appearance: Present: no acute distress





- EENT


Eyes: Present: PERRL, EOM intact


ENT: hearing intact, clear oral mucosa, dentition normal





- Neck


Neck: Present: supple, normal ROM





- Respiratory


Respiratory effort: normal


Respiratory: bilateral: CTA





- Cardiovascular


Rhythm: regular


Heart Sounds: Present: S1 & S2.  Absent: gallop, rub





- Extremities


Extremities: no ischemia, No edema, Full ROM





- Abdominal


General gastrointestinal: soft, non-tender, non-distended, normal bowel sounds





- Integumentary


Integumentary: Present: clear, warm, dry





- Neurologic


Neurologic: CNII-XII intact, moves all extremities





Results





- Labs


CBC & Chem 7: 


                                 07/03/19 04:43





                                 07/05/19 04:20


Labs: 


                             Laboratory Last Values











WBC  4.4 K/mm3 (4.5-11.0)  L  07/03/19  04:43    


 


RBC  4.67 M/mm3 (3.65-5.03)   07/03/19  04:43    


 


Hgb  13.1 gm/dl (11.8-15.2)   07/03/19  04:43    


 


Hct  40.4 % (35.5-45.6)   07/03/19  04:43    


 


MCV  87 fl (84-94)   07/03/19  04:43    


 


MCH  28 pg (28-32)   07/03/19  04:43    


 


MCHC  33 % (32-34)   07/03/19  04:43    


 


RDW  20.4 % (13.2-15.2)  H  07/03/19  04:43    


 


Plt Count  309 K/mm3 (140-440)   07/03/19  04:43    


 


Lymph % (Auto)  25.7 % (13.4-35.0)   07/03/19  04:43    


 


Mono % (Auto)  10.7 % (0.0-7.3)  H  07/03/19  04:43    


 


Eos % (Auto)  7.5 % (0.0-4.3)  H  07/03/19  04:43    


 


Baso % (Auto)  1.4 % (0.0-1.8)   07/03/19  04:43    


 


Lymph #  1.1 K/mm3 (1.2-5.4)  L  07/03/19  04:43    


 


Mono #  0.5 K/mm3 (0.0-0.8)   07/03/19  04:43    


 


Eos #  0.3 K/mm3 (0.0-0.4)   07/03/19  04:43    


 


Baso #  0.1 K/mm3 (0.0-0.1)   07/03/19  04:43    


 


Seg Neutrophils %  54.7 % (40.0-70.0)   07/03/19  04:43    


 


Seg Neutrophils #  2.4 K/mm3 (1.8-7.7)   07/03/19  04:43    


 


Sodium  143 mmol/L (137-145)   07/05/19  04:20    


 


Potassium  3.8 mmol/L (3.6-5.0)   07/05/19  04:20    


 


Chloride  103.4 mmol/L ()   07/05/19  04:20    


 


Carbon Dioxide  31 mmol/L (22-30)  H  07/05/19  04:20    


 


  12 mmol/L  07/05/19  04:20    


 


BUN  15 mg/dL (9-20)   07/05/19  04:20    


 


  1.0 mg/dL (0.8-1.5)   07/05/19  04:20    


 


Estimated GFR  > 60 ml/min  07/05/19  04:20    


 


  15 %  07/05/19  04:20    


 


Glucose  104 mg/dL ()  H  07/05/19  04:20    


 


Calcium  8.8 mg/dL (8.4-10.2)   07/05/19  04:20    


 


Magnesium  1.90 mg/dL (1.7-2.3)   07/04/19  04:32    


 


  1.30 mg/dL (0.1-1.2)  H  07/02/19  01:11    


 


AST  17 units/L (5-40)   07/02/19  01:11    


 


ALT  19 units/L (7-56)   07/02/19  01:11    


 


  172 units/L ()  H  07/02/19  01:11    


 


  64 units/L ()   07/02/19  01:11    


 


CK-MB (CK-2)  1.8 ng/mL (0.0-4.0)   07/02/19  01:11    


 


CK-MB (CK-2) Rel Index  2.8  (0-4)   07/02/19  01:11    


 


  0.014 ng/mL (0.00-0.029)   07/02/19  01:11    


 


NT-Pro-B Natriuret Pep  23914 pg/mL (0-900)  H  07/02/19  01:11    


 


  6.4 g/dL (6.3-8.2)   07/02/19  01:11    


 


  3.2 g/dL (3.9-5)  L  07/02/19  01:11    


 


  1.0 %  07/02/19  01:11    


 


PTH Intact  232.5 pg/mL (15-65)  H  07/03/19  04:43    


 


  Yellow  (Yellow)   07/02/19  Unknown


 


  Clear  (Clear)   07/02/19  Unknown


 


  6.0  (5.0-7.0)   07/02/19  Unknown


 


Ur Specific Gravity  1.012  (1.003-1.030)   07/02/19  Unknown


 


  <15 mg/dl mg/dL (Negative)   07/02/19  Unknown


 


  Neg mg/dL (Negative)   07/02/19  Unknown


 


  Neg mg/dL (Negative)   07/02/19  Unknown


 


  Neg  (Negative)   07/02/19  Unknown


 


  Neg  (Negative)   07/02/19  Unknown


 


  Neg  (Negative)   07/02/19  Unknown


 


  4.0 mg/dL (<2.0)   07/02/19  Unknown


 


Ur Leukocyte Esterase  Neg  (Negative)   07/02/19  Unknown


 


  < 1.0 /HPF (0.0-6.0)   07/02/19  Unknown


 


  1.0 /HPF (0.0-6.0)   07/02/19  Unknown


 


  64.4 mg/dL (0.1-20.0)  H  07/02/19  Unknown


 


Protein/Creatinin Ratio  0.29   07/02/19  Unknown


 


  49 mmol/L  07/02/19  Unknown


 


  18 mg/dL (5-11.8)  H  07/02/19  Unknown


 


Plasma/Serum Alcohol  < 0.01 % (0-0.07)   07/02/19  01:11    














Active Medications





- Current Medications


Current Medications: 














Generic Name Dose Route Start Last Admin





  Trade Name Freq  PRN Reason Stop Dose Admin


 


Acetaminophen  650 mg  07/02/19 04:52  07/05/19 03:56





  Tylenol  PO   650 mg





  Q4H PRN   Administration





  Pain MILD(1-3)/Fever >100.5/HA  


 


Albuterol  2.5 mg  07/02/19 04:52 





  Proventil  IH  





  Q3HRT PRN  





  Shortness Of Breath  


 


Amlodipine Besylate  10 mg  07/02/19 10:00  07/06/19 10:33





  Norvasc  PO   10 mg





  QDAY ARASH   Administration


 


Aspirin  81 mg  07/02/19 10:00  07/06/19 10:35





  Baby Aspirin  PO   Not Given





  QDAY ARASH  


 


Carvedilol  3.125 mg  07/04/19 10:00  07/06/19 10:34





  Coreg  PO   3.125 mg





  BID ARASH   Administration


 


Docusate Sodium  100 mg  07/02/19 10:00  07/06/19 10:35





  Colace  PO   Not Given





  BID ARASH  


 


Famotidine  20 mg  07/04/19 00:45  07/06/19 10:34





  Pepcid  PO   20 mg





  BID ARASH   Administration


 


Furosemide  40 mg  07/04/19 18:00  07/06/19 06:09





  Lasix  IV   40 mg





  0600,1800 ARASH   Administration


 


Heparin Sodium (Porcine)  5,000 unit  07/02/19 10:00  07/06/19 10:36





  Heparin  SUB-Q   Not Given





  Q12HR ARASH  


 


Hydralazine HCl  10 mg  07/02/19 04:57  07/04/19 14:24





  Apresoline  IV   10 mg





  Q4H PRN   Administration





  Blood Pressure  


 


Hydralazine HCl  50 mg  07/04/19 08:00  07/06/19 10:34





  Apresoline  PO   50 mg





  TID ARASH   Administration


 


Ondansetron HCl  4 mg  07/02/19 04:52  07/05/19 05:51





  Zofran  IV   4 mg





  Q8H PRN   Administration





  Nausea And Vomiting  


 


Oxybutynin Chloride  2.5 mg  07/04/19 10:00  07/06/19 10:35





  Ditropan  PO   2.5 mg





  DAILY ARASH   Administration


 


Oxycodone/Acetaminophen  1 tab  07/02/19 04:52  07/03/19 18:30





  Percocet 5/325  PO   1 tab





  Q6H PRN   Administration





  Pain, Moderate (4-6)  


 


Sodium Chloride  10 ml  07/02/19 10:00  07/06/19 10:36





  Sodium Chloride Flush Syringe 10 Ml  IV   10 ml





  BID ARASH   Administration


 


Sodium Chloride  10 ml  07/02/19 04:52 





  Sodium Chloride Flush Syringe 10 Ml  IV  





  PRN PRN  





  LINE FLUSH  














Nutrition/Malnutrition Assess





- Dietary Evaluation


Nutrition/Malnutrition Findings: 


                                        





Nutrition Notes                                            Start:  07/02/19 

09:38


Freq:                                                      Status: Active       




Protocol:                                                                       




 Document     07/03/19 16:17  RM  (Rec: 07/03/19 16:19  RM  [id number])


 Nutrition Notes


     Initial or Follow up                        Brief Note


     Current Diagnosis                           CKD(stage I-IV),COPD,


                                                 Hypertension,Heart Failure


     Other Pertinent Diagnosis                   Throat CA


     Current Diet                                Pureed


     Height                                      5 ft 7 in


     Weight                                      88.6 kg


     Ideal Body Weight (kg)                      67.27


     BMI                                         30.6


     Subjective/Other Information                Pt stated that his appetite is


                                                 good and that he eats all of


                                                 his meals. Stated that he did


                                                 not receive lunch today and


                                                 was requesting meal to be


                                                 brought at time of visit.


                                                 Writer spoke w/nurse who


                                                 stated that tech was going to


                                                 kitchen to get tray for pt.


 Nutrition Intervention


     Revisit per MD consult or patient           Sign Off


      request:

## 2019-07-06 NOTE — PROGRESS NOTE
Assessment and Plan





1. Acute kidney injury:


Likely Vasomotor JAYDEN in the setting of CHF.


Suspect Cardio-renal syndrome.


Renal US was negative for hydro.


Renal function is better.


Monitor renal function.


Avoid nephrotoxic agents.


Meds dosage based on GFR.





2. FEN:


Volume overlaod, continue Lasix.


Hypernatremia, Na level is better.


Hypokalemia, K level is better.


Metabolic alkalosis, Diamox prn.


Monitor lytes.





3. Acute decompensated CHF.





4. Hypertension:


Monitor BP.





5. H/o throat cancer.











Subjective


Date of service: 07/06/19


Interval history: 


Patient was seen and examined at the bedside.








Objective





- Vital Signs


Vital signs: 


                               Vital Signs - 12hr











  07/06/19 07/06/19 07/06/19





  02:35 02:43 07:25


 


Temperature  98.0 F 98.0 F


 


Pulse Rate  54 L 65


 


Respiratory  21 20





Rate   


 


Blood Pressure  149/88 153/82


 


O2 Sat by Pulse 100 96 95





Oximetry   














  07/06/19 07/06/19





  07:48 10:33


 


Temperature  


 


Pulse Rate 65 65


 


Respiratory  





Rate  


 


Blood Pressure  153/82


 


O2 Sat by Pulse  





Oximetry  














- General Appearance


General appearance: well-developed, well-nourished, appears stated age, other 

(no distress)


EENT: ATNC, PERRL, vision intact


Neck: JVD, supple


Respiratory: Present: Rales


Cardiology: S1S2, no murmurs


Gastrointestinal: normoactive bowel sounds, no tenderness, no distended


Integumentary: no rash, warm and dry


Neurologic: no focal deficit, no asterixis, alert and oriented x3


Musculoskeletal: other (1 to 2+ edema of extremities)





- Lab





                                 07/03/19 04:43





                                 07/05/19 04:20


                             Most recent lab results











Calcium  8.8 mg/dL (8.4-10.2)   07/05/19  04:20    


 


Magnesium  1.90 mg/dL (1.7-2.3)   07/04/19  04:32    


 


  64.4 mg/dL (0.1-20.0)  H  07/02/19  Unknown


 


  49 mmol/L  07/02/19  Unknown


 


  18 mg/dL (5-11.8)  H  07/02/19  Unknown














Medications & Allergies





- Medications


Allergies/Adverse Reactions: 


                                    Allergies





levofloxacin [From Levaquin] Allergy (Verified 07/02/19 00:40)


   Unknown








Home Medications: 


                                Home Medications











 Medication  Instructions  Recorded  Confirmed  Last Taken  Type


 


Coreg 3.125 mg PO BID 07/02/19 07/02/19 06/30/19 History


 


Furosemide 20 mg PO DAILY 07/02/19 07/02/19 06/30/19 History


 


Oxybutynin 2.5 mg PO DAILY 07/02/19 07/02/19 06/30/19 History


 


Pepcid 20 mg PO BID 07/02/19 07/02/19 06/30/19 History


 


Ranitidine HCl 150 mg PO BID 07/02/19 07/02/19 06/30/19 History


 


hydrALAZINE 50 mg PO TID 07/02/19 07/02/19 06/30/19 History











Active Medications: 














Generic Name Dose Route Start Last Admin





  Trade Name Freq  PRN Reason Stop Dose Admin


 


Acetaminophen  650 mg  07/02/19 04:52  07/05/19 03:56





  Tylenol  PO   650 mg





  Q4H PRN   Administration





  Pain MILD(1-3)/Fever >100.5/HA  


 


Albuterol  2.5 mg  07/02/19 04:52 





  Proventil  IH  





  Q3HRT PRN  





  Shortness Of Breath  


 


Amlodipine Besylate  10 mg  07/02/19 10:00  07/06/19 10:33





  Norvasc  PO   10 mg





  QDAY ARASH   Administration


 


Aspirin  81 mg  07/02/19 10:00  07/06/19 10:35





  Baby Aspirin  PO   Not Given





  QDAY ARASH  


 


Carvedilol  3.125 mg  07/04/19 10:00  07/06/19 10:34





  Coreg  PO   3.125 mg





  BID ARASH   Administration


 


Docusate Sodium  100 mg  07/02/19 10:00  07/06/19 10:35





  Colace  PO   Not Given





  BID ARASH  


 


Famotidine  20 mg  07/04/19 00:45  07/06/19 10:34





  Pepcid  PO   20 mg





  BID ARASH   Administration


 


Furosemide  40 mg  07/04/19 18:00  07/06/19 06:09





  Lasix  IV   40 mg





  0600,1800 ARASH   Administration


 


Heparin Sodium (Porcine)  5,000 unit  07/02/19 10:00  07/06/19 10:36





  Heparin  SUB-Q   Not Given





  Q12HR ARASH  


 


Hydralazine HCl  10 mg  07/02/19 04:57  07/04/19 14:24





  Apresoline  IV   10 mg





  Q4H PRN   Administration





  Blood Pressure  


 


Hydralazine HCl  50 mg  07/04/19 08:00  07/06/19 10:34





  Apresoline  PO   50 mg





  TID ARASH   Administration


 


Ondansetron HCl  4 mg  07/02/19 04:52  07/05/19 05:51





  Zofran  IV   4 mg





  Q8H PRN   Administration





  Nausea And Vomiting  


 


Oxybutynin Chloride  2.5 mg  07/04/19 10:00  07/06/19 10:35





  Ditropan  PO   2.5 mg





  DAILY ARASH   Administration


 


Oxycodone/Acetaminophen  1 tab  07/02/19 04:52  07/03/19 18:30





  Percocet 5/325  PO   1 tab





  Q6H PRN   Administration





  Pain, Moderate (4-6)  


 


Sodium Chloride  10 ml  07/02/19 10:00  07/06/19 10:36





  Sodium Chloride Flush Syringe 10 Ml  IV   10 ml





  BID ARASH   Administration


 


Sodium Chloride  10 ml  07/02/19 04:52 





  Sodium Chloride Flush Syringe 10 Ml  IV  





  PRN PRN  





  LINE FLUSH

## 2019-07-06 NOTE — PROGRESS NOTE
Assessment and Plan





1.  Chronic combined systolic and diastolic heart failure


2.  Dilated cardiomyopathy left ventricular ejection fraction 10-15%


3.  Chronic obstructive pulmonary disease


4.  Essential hypertension


5.  History of chronic cancer





Plan.


Continue present management continued diuresis and follow daily weights 


CHF education





Subjective


Date of service: 07/06/19


Interval history: 





No cardiac symptoms.





Objective


                                   Vital Signs











  Temp Pulse Resp BP Pulse Ox


 


 07/06/19 07:48   65   


 


 07/06/19 07:25  98.0 F  65  20  153/82  95


 


 07/06/19 02:43  98.0 F  54 L  21  149/88  96


 


 07/06/19 02:35      100


 


 07/05/19 21:27   64   145/73 


 


 07/05/19 20:32   63    96


 


 07/05/19 20:29  97.5 F L  64  18  145/73  94


 


 07/05/19 15:18  98.4 F  70  18  155/79  95


 


 07/05/19 11:39  98.6 F  58 L  18  141/78  95


 


 07/05/19 10:00   65   














- Physical Examination


General: Appears Well, No Apparent Distress


HEENT: Positive: PERRL


Neck: Positive: neck supple, trachea midline, JVD/HJR


Cardiac: Positive: Reg Rate and Rhythm, Regular Rate, S1/S2, S3, Gallop, PMI, 

Dilated, Laterally Displaced


Lungs: Positive: clear to auscultation, No Wheeze, Rales, Rhonchi


Neuro: Positive: Grossly Intact


Abdomen: Positive: Unremarkable, Soft, Active Bowel Sounds


Extremities: Present: edema, +1 Edema





- Imaging and Cardiology


EKG: image reviewed (59 bpm, sinus bradycardia)

## 2019-07-07 LAB
BUN SERPL-MCNC: 23 MG/DL (ref 9–20)
BUN/CREAT SERPL: 18 %
CALCIUM SERPL-MCNC: 8.7 MG/DL (ref 8.4–10.2)
HEMOLYSIS INDEX: 3

## 2019-07-07 RX ADMIN — OXYBUTYNIN CHLORIDE SCH MG: 5 TABLET ORAL at 12:35

## 2019-07-07 RX ADMIN — HEPARIN SODIUM SCH: 5000 INJECTION, SOLUTION INTRAVENOUS; SUBCUTANEOUS at 21:50

## 2019-07-07 RX ADMIN — CARVEDILOL SCH MG: 3.12 TABLET, FILM COATED ORAL at 12:34

## 2019-07-07 RX ADMIN — DOCUSATE SODIUM SCH: 100 CAPSULE, LIQUID FILLED ORAL at 12:36

## 2019-07-07 RX ADMIN — FAMOTIDINE SCH MG: 20 TABLET ORAL at 12:36

## 2019-07-07 RX ADMIN — CARVEDILOL SCH MG: 3.12 TABLET, FILM COATED ORAL at 21:50

## 2019-07-07 RX ADMIN — AMLODIPINE BESYLATE SCH: 10 TABLET ORAL at 12:38

## 2019-07-07 RX ADMIN — HYDRALAZINE HYDROCHLORIDE SCH MG: 25 TABLET, FILM COATED ORAL at 20:40

## 2019-07-07 RX ADMIN — FAMOTIDINE SCH MG: 20 TABLET ORAL at 21:49

## 2019-07-07 RX ADMIN — DOCUSATE SODIUM SCH: 100 CAPSULE, LIQUID FILLED ORAL at 21:50

## 2019-07-07 RX ADMIN — Medication SCH ML: at 19:22

## 2019-07-07 RX ADMIN — FUROSEMIDE SCH MG: 10 INJECTION, SOLUTION INTRAVENOUS at 19:20

## 2019-07-07 RX ADMIN — Medication SCH ML: at 21:51

## 2019-07-07 RX ADMIN — ASPIRIN SCH: 81 TABLET, CHEWABLE ORAL at 12:36

## 2019-07-07 RX ADMIN — HYDRALAZINE HYDROCHLORIDE SCH: 25 TABLET, FILM COATED ORAL at 14:00

## 2019-07-07 RX ADMIN — HEPARIN SODIUM SCH: 5000 INJECTION, SOLUTION INTRAVENOUS; SUBCUTANEOUS at 12:36

## 2019-07-07 RX ADMIN — FUROSEMIDE SCH MG: 10 INJECTION, SOLUTION INTRAVENOUS at 05:50

## 2019-07-07 RX ADMIN — HYDRALAZINE HYDROCHLORIDE SCH MG: 25 TABLET, FILM COATED ORAL at 12:32

## 2019-07-07 NOTE — PROGRESS NOTE
Assessment and Plan


Assessment and plan: 





73-year-old -American male with history of COPD, CHF, HTN, throat cancer 

who presents to [facility] ED with complaints of progressively worsening bilateral 

lower extremity pitting edema for the past 2-3 days.  On examination patient has

bilateral lower extremity 2+ pitting edema.   chest x-ray is suggestive of 

volume overload/CHF exacerbation.





Acute on Chronic combined systolic and diastolic heart failure


- Echo showed Dilated cardiomyopathy left ventricular ejection fraction EF 10-

15%


- Cardiology following, continue diuresis





Hypertensive urgency


Optimize blood pressure medications





Acute hypoxic respiratory failure


- Resolved, Patient is saturating well on room air





Chronic obstructive pulmonary disease


-Compensated





JAYDEN due to vasomotor nephropathy


Nephrology input appreciated, improving





Hypokalemia


Continue to replete, check magnesium





Hypernatremia; improving





Mild to moderate malnutrition


- Nutrition consult





Dysphagia


- Speech therapy evaluated her and recommend [place] consistency





Deconditioned


- PT/OT consulted





Disposition; continue inpatient care 





History


Interval history: 





Review of systems


Constitutional: No fevers, no malaise, no joint pains





CVS: No chest pain, orthopnea and dyspnea on exertion is improving





GI: No abdominal pain, no diarrhea, no vomiting, no constipation





Respiratory: no wheezing, no coughing





Hospitalist Physical





- Physical exam


Narrative exam: 





General.: Appears well, no distress, nontoxic


HEENT: Moist mucous membranes, extraocular muscles intact, no lymphadenopathy


Neck: supple


Cardiac: S1-S2 heard


Lungs: Crackles in bases


Abdomen: soft , nontender,  nondistended,  bowel sounds positive


Extremities: 3 plus edema in LE


Skin: no rash or lesions


Neurologic: no gross focal deficits


Psych: calm, and cooperative





- Constitutional


Vitals: 


                                        











Temp Pulse Resp BP Pulse Ox


 


 97.6 F   69   18   177/100   94 


 


 07/07/19 07:32  07/07/19 07:32  07/07/19 07:32  07/07/19 07:32  07/07/19 07:32











General appearance: Present: no acute distress





Results





- Labs


CBC & Chem 7: 


                                 07/03/19 04:43





                                 07/07/19 03:20


Labs: 


                             Laboratory Last Values











WBC  4.4 K/mm3 (4.5-11.0)  L  07/03/19  04:43    


 


RBC  4.67 M/mm3 (3.65-5.03)   07/03/19  04:43    


 


Hgb  13.1 gm/dl (11.8-15.2)   07/03/19  04:43    


 


Hct  40.4 % (35.5-45.6)   07/03/19  04:43    


 


MCV  87 fl (84-94)   07/03/19  04:43    


 


MCH  28 pg (28-32)   07/03/19  04:43    


 


MCHC  33 % (32-34)   07/03/19  04:43    


 


RDW  20.4 % (13.2-15.2)  H  07/03/19  04:43    


 


Plt Count  309 K/mm3 (140-440)   07/03/19  04:43    


 


Lymph % (Auto)  25.7 % (13.4-35.0)   07/03/19  04:43    


 


Mono % (Auto)  10.7 % (0.0-7.3)  H  07/03/19  04:43    


 


Eos % (Auto)  7.5 % (0.0-4.3)  H  07/03/19  04:43    


 


Baso % (Auto)  1.4 % (0.0-1.8)   07/03/19  04:43    


 


Lymph #  1.1 K/mm3 (1.2-5.4)  L  07/03/19  04:43    


 


Mono #  0.5 K/mm3 (0.0-0.8)   07/03/19  04:43    


 


Eos #  0.3 K/mm3 (0.0-0.4)   07/03/19  04:43    


 


Baso #  0.1 K/mm3 (0.0-0.1)   07/03/19  04:43    


 


Seg Neutrophils %  54.7 % (40.0-70.0)   07/03/19  04:43    


 


Seg Neutrophils #  2.4 K/mm3 (1.8-7.7)   07/03/19  04:43    


 


Sodium  144 mmol/L (137-145)   07/07/19  03:20    


 


Potassium  3.2 mmol/L (3.6-5.0)  L  07/07/19  03:20    


 


Chloride  104.3 mmol/L ()   07/07/19  03:20    


 


Carbon Dioxide  29 mmol/L (22-30)   07/07/19  03:20    


 


  14 mmol/L  07/07/19  03:20    


 


BUN  23 mg/dL (9-20)  H  07/07/19  03:20    


 


  1.3 mg/dL (0.8-1.5)   07/07/19  03:20    


 


Estimated GFR  > 60 ml/min  07/07/19  03:20    


 


  18 %  07/07/19  03:20    


 


Glucose  107 mg/dL ()  H  07/07/19  03:20    


 


Calcium  8.7 mg/dL (8.4-10.2)   07/07/19  03:20    


 


Magnesium  1.90 mg/dL (1.7-2.3)   07/04/19  04:32    


 


  1.30 mg/dL (0.1-1.2)  H  07/02/19  01:11    


 


AST  17 units/L (5-40)   07/02/19  01:11    


 


ALT  19 units/L (7-56)   07/02/19  01:11    


 


  172 units/L ()  H  07/02/19  01:11    


 


  64 units/L ()   07/02/19  01:11    


 


CK-MB (CK-2)  1.8 ng/mL (0.0-4.0)   07/02/19  01:11    


 


CK-MB (CK-2) Rel Index  2.8  (0-4)   07/02/19  01:11    


 


  0.014 ng/mL (0.00-0.029)   07/02/19  01:11    


 


NT-Pro-B Natriuret Pep  17145 pg/mL (0-900)  H  07/02/19  01:11    


 


  6.4 g/dL (6.3-8.2)   07/02/19  01:11    


 


  3.2 g/dL (3.9-5)  L  07/02/19  01:11    


 


  1.0 %  07/02/19  01:11    


 


PTH Intact  232.5 pg/mL (15-65)  H  07/03/19  04:43    


 


  Yellow  (Yellow)   07/02/19  Unknown


 


  Clear  (Clear)   07/02/19  Unknown


 


  6.0  (5.0-7.0)   07/02/19  Unknown


 


Ur Specific Gravity  1.012  (1.003-1.030)   07/02/19  Unknown


 


  <15 mg/dl mg/dL (Negative)   07/02/19  Unknown


 


  Neg mg/dL (Negative)   07/02/19  Unknown


 


  Neg mg/dL (Negative)   07/02/19  Unknown


 


  Neg  (Negative)   07/02/19  Unknown


 


  Neg  (Negative)   07/02/19  Unknown


 


  Neg  (Negative)   07/02/19  Unknown


 


  4.0 mg/dL (<2.0)   07/02/19  Unknown


 


Ur Leukocyte Esterase  Neg  (Negative)   07/02/19  Unknown


 


  < 1.0 /HPF (0.0-6.0)   07/02/19  Unknown


 


  1.0 /HPF (0.0-6.0)   07/02/19  Unknown


 


  64.4 mg/dL (0.1-20.0)  H  07/02/19  Unknown


 


Protein/Creatinin Ratio  0.29   07/02/19  Unknown


 


  49 mmol/L  07/02/19  Unknown


 


  18 mg/dL (5-11.8)  H  07/02/19  Unknown


 


Plasma/Serum Alcohol  < 0.01 % (0-0.07)   07/02/19  01:11    














Active Medications





- Current Medications


Current Medications: 














Generic Name Dose Route Start Last Admin





  Trade Name Freq  PRN Reason Stop Dose Admin


 


Acetaminophen  650 mg  07/02/19 04:52  07/05/19 03:56





  Tylenol  PO   650 mg





  Q4H PRN   Administration





  Pain MILD(1-3)/Fever >100.5/HA  


 


Albuterol  2.5 mg  07/02/19 04:52 





  Proventil  IH  





  Q3HRT PRN  





  Shortness Of Breath  


 


Amlodipine Besylate  10 mg  07/02/19 10:00  07/06/19 10:33





  Norvasc  PO   10 mg





  QDAY ARASH   Administration


 


Aspirin  81 mg  07/02/19 10:00  07/06/19 10:35





  Baby Aspirin  PO   Not Given





  QDAY ARASH  


 


Carvedilol  3.125 mg  07/04/19 10:00  07/06/19 21:40





  Coreg  PO   3.125 mg





  BID ARASH   Administration


 


Docusate Sodium  100 mg  07/02/19 10:00  07/06/19 21:41





  Colace  PO   Not Given





  BID ARASH  


 


Famotidine  20 mg  07/04/19 00:45  07/06/19 21:40





  Pepcid  PO   20 mg





  BID ARASH   Administration


 


Furosemide  40 mg  07/04/19 18:00  07/07/19 05:50





  Lasix  IV   40 mg





  0600,1800 ARASH   Administration


 


Heparin Sodium (Porcine)  5,000 unit  07/02/19 10:00  07/06/19 21:41





  Heparin  SUB-Q   Not Given





  Q12HR [facility]  


 


Hydralazine HCl  10 mg  07/02/19 04:57  07/04/19 14:24





  Apresoline  IV   10 mg





  Q4H PRN   Administration





  Blood Pressure  


 


Hydralazine HCl  50 mg  07/04/19 08:00  07/06/19 20:02





  Apresoline  PO   50 mg





  TID ARASH   Administration


 


Ondansetron HCl  4 mg  07/02/19 04:52  07/05/19 05:51





  Zofran  IV   4 mg





  Q8H PRN   Administration





  Nausea And Vomiting  


 


Oxybutynin Chloride  2.5 mg  07/04/19 10:00  07/06/19 10:35





  Ditropan  PO   2.5 mg





  DAILY ARASH   Administration


 


Oxycodone/Acetaminophen  1 tab  07/02/19 04:52  07/03/19 18:30





  Percocet 5/325  PO   1 tab





  Q6H PRN   Administration





  Pain, Moderate (4-6)  


 


Potassium Chloride  40 meq  07/07/19 10:42 





  K-Dur  PO  07/07/19 10:43 





  ONCE ONE  


 


Sodium Chloride  10 ml  07/02/19 10:00  07/06/19 21:48





  Sodium Chloride Flush Syringe 10 Ml  IV   10 ml





  BID ARASH   Administration


 


Sodium Chloride  10 ml  07/02/19 04:52 





  Sodium Chloride Flush Syringe 10 Ml  IV  





  PRN PRN  





  LINE FLUSH  














Nutrition/Malnutrition Assess





- Dietary Evaluation


Nutrition/Malnutrition Findings: 


                                        





Nutrition Notes                                            Start:  07/02/19 

09:38


Freq:                                                      Status: Active       




Protocol:                                                                       




 Document     07/03/19 16:17  RM  (Rec: 07/03/19 16:19  RM  [id number])


 Nutrition Notes


     Initial or Follow up                        Brief Note


     Current Diagnosis                           CKD(stage I-IV),COPD,


                                                 Hypertension,Heart Failure


     Other Pertinent Diagnosis                   Throat CA


     Current Diet                                Pureed


     Height                                      5 ft 7 in


     Weight                                      88.6 kg


     Ideal Body Weight (kg)                      67.27


     BMI                                         30.6


     Subjective/Other Information                Pt stated that his appetite is


                                                 good and that he eats all of


                                                 his meals. Stated that he did


                                                 not receive lunch today and


                                                 was requesting meal to be


                                                 brought at time of visit.


                                                 Writer spoke w/nurse who


                                                 stated that tech was going to


                                                 kitchen to get tray for pt.


 Nutrition Intervention


     Revisit per MD consult or patient           Sign Off


      request:

## 2019-07-07 NOTE — PROGRESS NOTE
Assessment and Plan





1. Acute kidney injury:


Likely Vasomotor JAYDEN in the setting of CHF.


Suspect Cardio-renal syndrome.


Renal US was negative for hydro.


Renal function is better.


Monitor renal function.


Avoid nephrotoxic agents.


Meds dosage based on GFR.





2. FEN:


Volume overlaod, continue Lasix.


Hypernatremia, Na level is better.


Hypokalemia, replete K.


Metabolic alkalosis, Diamox prn.


Monitor lytes.





3. Acute decompensated CHF.





4. Hypertension:


Monitor BP.





5. H/o throat cancer.











Subjective


Date of service: 07/07/19


Interval history: 


Patient was seen and examined at the bedside.








Objective





- Vital Signs


Vital signs: 


                               Vital Signs - 12hr











  07/07/19 07/07/19 07/07/19





  02:55 02:57 07:32


 


Temperature 97.6 F  97.6 F


 


Pulse Rate  49 L 69


 


Respiratory 22  18





Rate   


 


Blood Pressure 141/79  177/100


 


O2 Sat by Pulse  95 94





Oximetry   














- General Appearance


General appearance: well-developed, well-nourished, appears stated age, other 

(no distress)


EENT: ATNC, PERRL, vision intact, hearing diminished


Neck: supple


Respiratory: Present: Clear to Ascultation


Cardiology: S1S2, no murmurs


Gastrointestinal: normoactive bowel sounds, no tenderness, no distended


Integumentary: no rash, warm and dry


Neurologic: no focal deficit, no asterixis


Musculoskeletal: other (1+ edema of both LEs noted)





- Lab





                                 07/03/19 04:43





                                 07/07/19 03:20


                             Most recent lab results











Calcium  8.7 mg/dL (8.4-10.2)   07/07/19  03:20    


 


Magnesium  1.90 mg/dL (1.7-2.3)   07/04/19  04:32    


 


  64.4 mg/dL (0.1-20.0)  H  07/02/19  Unknown


 


  49 mmol/L  07/02/19  Unknown


 


  18 mg/dL (5-11.8)  H  07/02/19  Unknown














Medications & Allergies





- Medications


Allergies/Adverse Reactions: 


                                    Allergies





levofloxacin [From Levaquin] Allergy (Verified 07/02/19 00:40)


   Unknown








Home Medications: 


                                Home Medications











 Medication  Instructions  Recorded  Confirmed  Last Taken  Type


 


Coreg 3.125 mg PO BID 07/02/19 07/02/19 06/30/19 History


 


Furosemide 20 mg PO DAILY 07/02/19 07/02/19 06/30/19 History


 


Oxybutynin 2.5 mg PO DAILY 07/02/19 07/02/19 06/30/19 History


 


Pepcid 20 mg PO BID 07/02/19 07/02/19 06/30/19 History


 


Ranitidine HCl 150 mg PO BID 07/02/19 07/02/19 06/30/19 History


 


hydrALAZINE 50 mg PO TID 07/02/19 07/02/19 06/30/19 History











Active Medications: 














Generic Name Dose Route Start Last Admin





  Trade Name Freq  PRN Reason Stop Dose Admin


 


Acetaminophen  650 mg  07/02/19 04:52  07/05/19 03:56





  Tylenol  PO   650 mg





  Q4H PRN   Administration





  Pain MILD(1-3)/Fever >100.5/HA  


 


Albuterol  2.5 mg  07/02/19 04:52 





  Proventil  IH  





  Q3HRT PRN  





  Shortness Of Breath  


 


Amlodipine Besylate  10 mg  07/02/19 10:00  07/06/19 10:33





  Norvasc  PO   10 mg





  QDAY ARASH   Administration


 


Aspirin  81 mg  07/02/19 10:00  07/06/19 10:35





  Baby Aspirin  PO   Not Given





  QDAY ARASH  


 


Carvedilol  3.125 mg  07/04/19 10:00  07/06/19 21:40





  Coreg  PO   3.125 mg





  BID ARASH   Administration


 


Docusate Sodium  100 mg  07/02/19 10:00  07/06/19 21:41





  Colace  PO   Not Given





  BID ARASH  


 


Famotidine  20 mg  07/04/19 00:45  07/06/19 21:40





  Pepcid  PO   20 mg





  BID ARASH   Administration


 


Furosemide  40 mg  07/04/19 18:00  07/07/19 05:50





  Lasix  IV   40 mg





  0600,1800 ARASH   Administration


 


Heparin Sodium (Porcine)  5,000 unit  07/02/19 10:00  07/06/19 21:41





  Heparin  SUB-Q   Not Given





  Q12HR ARASH  


 


Hydralazine HCl  10 mg  07/02/19 04:57  07/04/19 14:24





  Apresoline  IV   10 mg





  Q4H PRN   Administration





  Blood Pressure  


 


Hydralazine HCl  50 mg  07/04/19 08:00  07/06/19 20:02





  Apresoline  PO   50 mg





  TID ARASH   Administration


 


Ondansetron HCl  4 mg  07/02/19 04:52  07/05/19 05:51





  Zofran  IV   4 mg





  Q8H PRN   Administration





  Nausea And Vomiting  


 


Oxybutynin Chloride  2.5 mg  07/04/19 10:00  07/06/19 10:35





  Ditropan  PO   2.5 mg





  DAILY ARASH   Administration


 


Oxycodone/Acetaminophen  1 tab  07/02/19 04:52  07/03/19 18:30





  Percocet 5/325  PO   1 tab





  Q6H PRN   Administration





  Pain, Moderate (4-6)  


 


Sodium Chloride  10 ml  07/02/19 10:00  07/06/19 21:48





  Sodium Chloride Flush Syringe 10 Ml  IV   10 ml





  BID ARASH   Administration


 


Sodium Chloride  10 ml  07/02/19 04:52 





  Sodium Chloride Flush Syringe 10 Ml  IV  





  PRN PRN  





  LINE FLUSH

## 2019-07-07 NOTE — PROGRESS NOTE
Assessment and Plan





1.  Chronic combined systolic and diastolic heart failure


2.  Dilated cardiomyopathy left ventricular ejection fraction 10-15%


3.  Chronic obstructive pulmonary disease


4.  Essential hypertension


5.  History of chronic cancer





Plan.


Continue present management continued diuresis and follow daily weights 


CHF education





Subjective


Date of service: 07/14/19


Interval history: 





No cardiac symptoms.





Objective


                                   Vital Signs











  Temp Pulse Resp BP Pulse Ox


 


 07/07/19 07:32  97.6 F  69  18  177/100  94


 


 07/07/19 02:57   49 L    95


 


 07/07/19 02:55  97.6 F   22  141/79 


 


 07/06/19 22:00   68    98


 


 07/06/19 21:40   68   132/68 


 


 07/06/19 20:02   66   130/64 


 


 07/06/19 19:29  98.6 F  66  20  130/64  94


 


 07/06/19 13:35  97.8 F  48 L  20  154/72  95


 


 07/06/19 10:33   65   153/82 


 


 07/06/19 10:00   64   














- Physical Examination


General: Appears Well, No Apparent Distress


HEENT: Positive: PERRL


Neck: Positive: neck supple, trachea midline, JVD/HJR


Cardiac: Positive: Regular Rate, S1/S2, PMI, Dilated, Laterally Displaced


Lungs: Positive: clear to auscultation, No Wheeze, Rales, Rhonchi


Neuro: Positive: Grossly Intact


Abdomen: Positive: Unremarkable, Soft, Active Bowel Sounds


Extremities: Present: edema, +1 Edema





- Labs and Meds


                          Comprehensive Metabolic Panel











  07/07/19 Range/Units





  03:20 


 


Sodium  144  (137-145)  mmol/L


 


Potassium  3.2 L  (3.6-5.0)  mmol/L


 


Chloride  104.3  ()  mmol/L


 


Carbon Dioxide  29  (22-30)  mmol/L


 


BUN  23 H  (9-20)  mg/dL


 


Creatinine  1.3  (0.8-1.5)  mg/dL


 


Glucose  107 H  ()  mg/dL


 


Calcium  8.7  (8.4-10.2)  mg/dL














- Imaging and Cardiology


EKG: image reviewed (59 bpm, sinus bradycardia)

## 2019-07-08 LAB
BUN SERPL-MCNC: 26 MG/DL (ref 9–20)
BUN/CREAT SERPL: 19 %
CALCIUM SERPL-MCNC: 9 MG/DL (ref 8.4–10.2)
HEMOLYSIS INDEX: 6

## 2019-07-08 RX ADMIN — HYDRALAZINE HYDROCHLORIDE SCH MG: 25 TABLET, FILM COATED ORAL at 08:42

## 2019-07-08 RX ADMIN — HEPARIN SODIUM SCH: 5000 INJECTION, SOLUTION INTRAVENOUS; SUBCUTANEOUS at 10:19

## 2019-07-08 RX ADMIN — FAMOTIDINE SCH MG: 20 TABLET ORAL at 21:19

## 2019-07-08 RX ADMIN — AMLODIPINE BESYLATE SCH MG: 10 TABLET ORAL at 10:13

## 2019-07-08 RX ADMIN — CARVEDILOL SCH MG: 3.12 TABLET, FILM COATED ORAL at 21:19

## 2019-07-08 RX ADMIN — Medication SCH ML: at 21:20

## 2019-07-08 RX ADMIN — HEPARIN SODIUM SCH UNIT: 5000 INJECTION, SOLUTION INTRAVENOUS; SUBCUTANEOUS at 21:20

## 2019-07-08 RX ADMIN — DOCUSATE SODIUM SCH MG: 100 CAPSULE, LIQUID FILLED ORAL at 21:19

## 2019-07-08 RX ADMIN — CARVEDILOL SCH MG: 3.12 TABLET, FILM COATED ORAL at 10:14

## 2019-07-08 RX ADMIN — NIFEDIPINE SCH MG: 60 TABLET, EXTENDED RELEASE ORAL at 21:18

## 2019-07-08 RX ADMIN — FUROSEMIDE SCH MG: 10 INJECTION, SOLUTION INTRAVENOUS at 06:00

## 2019-07-08 RX ADMIN — HYDROCHLOROTHIAZIDE SCH MG: 25 TABLET ORAL at 21:19

## 2019-07-08 RX ADMIN — DOCUSATE SODIUM SCH MG: 100 CAPSULE, LIQUID FILLED ORAL at 10:13

## 2019-07-08 RX ADMIN — OXYBUTYNIN CHLORIDE SCH MG: 5 TABLET ORAL at 10:12

## 2019-07-08 RX ADMIN — FAMOTIDINE SCH MG: 20 TABLET ORAL at 10:13

## 2019-07-08 RX ADMIN — ASPIRIN SCH MG: 81 TABLET, CHEWABLE ORAL at 10:13

## 2019-07-08 RX ADMIN — Medication SCH ML: at 10:19

## 2019-07-08 RX ADMIN — HYDRALAZINE HYDROCHLORIDE SCH MG: 25 TABLET, FILM COATED ORAL at 14:31

## 2019-07-08 RX ADMIN — HYDRALAZINE HYDROCHLORIDE SCH MG: 25 TABLET, FILM COATED ORAL at 21:18

## 2019-07-08 NOTE — PROGRESS NOTE
Assessment and Plan





1. Acute kidney injury:


Likely Vasomotor JAYDEN in the setting of CHF.


Suspect Cardio-renal syndrome.


Renal US was negative for hydro.


Renal function did improve few days ago.


Now creatinine is increasing.


Lasix was stopped.


Monitor renal function.


Avoid nephrotoxic agents.


Meds dosage based on GFR.





2. FEN:


Volume overload, change to HCTZ.


Hypernatremia, encouraged free water intake.  HCTZ.


Hypokalemia, replete K as needed.


Metabolic alkalosis, Diamox prn.


Monitor lytes.





3. Acute decompensated CHF.





4. Hypertension:


Monitor BP.





5. H/o throat cancer.











Subjective


Date of service: 07/08/19


Interval history: 


Patient was seen and examined at the bedside.








Objective





- Vital Signs


Vital signs: 


                               Vital Signs - 12hr











  07/08/19 07/08/19 07/08/19





  03:20 07:43 08:42


 


Temperature 98.3 F 97.2 F L 


 


Pulse Rate 54 L 65 65


 


Respiratory 18 20 





Rate   


 


Blood Pressure  174/91 174/91


 


Blood Pressure 164/66  





[Left]   


 


O2 Sat by Pulse 100 99 





Oximetry   














  07/08/19 07/08/19 07/08/19





  10:00 10:12 10:13


 


Temperature   


 


Pulse Rate   66


 


Respiratory 20  





Rate   


 


Blood Pressure  171/92 171/92


 


Blood Pressure   





[Left]   


 


O2 Sat by Pulse 99  





Oximetry   














  07/08/19





  10:14


 


Temperature 


 


Pulse Rate 66


 


Respiratory 





Rate 


 


Blood Pressure 171/92


 


Blood Pressure 





[Left] 


 


O2 Sat by Pulse 





Oximetry 














- General Appearance


General appearance: well-developed, well-nourished, appears stated age, other 

(no distres)


EENT: ATNC, PERRL, vision intact, hearing diminished


Neck: supple


Respiratory: Present: Rales


Cardiology: regular, S1S2, no murmurs


Gastrointestinal: normoactive bowel sounds, no tenderness, no distended


Integumentary: warm and dry, chronic venous stasis


Neurologic: no focal deficit, no asterixis, alert and oriented x3, other 

(mumbling of words)


Musculoskeletal: other (1+ edema of both LEs noted)





- Lab





                                 07/03/19 04:43





                                 07/08/19 05:41


                             Most recent lab results











Calcium  9.0 mg/dL (8.4-10.2)   07/08/19  05:41    


 


Magnesium  2.10 mg/dL (1.7-2.3)   07/08/19  05:41    


 


  64.4 mg/dL (0.1-20.0)  H  07/02/19  Unknown


 


  49 mmol/L  07/02/19  Unknown


 


  18 mg/dL (5-11.8)  H  07/02/19  Unknown














Medications & Allergies





- Medications


Allergies/Adverse Reactions: 


                                    Allergies





levofloxacin [From Levaquin] Allergy (Verified 07/02/19 00:40)


   Unknown








Home Medications: 


                                Home Medications











 Medication  Instructions  Recorded  Confirmed  Last Taken  Type


 


Coreg 3.125 mg PO BID 07/02/19 07/02/19 06/30/19 History


 


Furosemide 20 mg PO DAILY 07/02/19 07/02/19 06/30/19 History


 


Oxybutynin 2.5 mg PO DAILY 07/02/19 07/02/19 06/30/19 History


 


Pepcid 20 mg PO BID 07/02/19 07/02/19 06/30/19 History


 


Ranitidine HCl 150 mg PO BID 07/02/19 07/02/19 06/30/19 History


 


hydrALAZINE 50 mg PO TID 07/02/19 07/02/19 06/30/19 History











Active Medications: 














Generic Name Dose Route Start Last Admin





  Trade Name Freq  PRN Reason Stop Dose Admin


 


Acetaminophen  650 mg  07/02/19 04:52  07/05/19 03:56





  Tylenol  PO   650 mg





  Q4H PRN   Administration





  Pain MILD(1-3)/Fever >100.5/HA  


 


Albuterol  2.5 mg  07/02/19 04:52  07/07/19 16:12





  Proventil  IH   2.5 mg





  Q3HRT PRN   Administration





  Shortness Of Breath  


 


Amlodipine Besylate  10 mg  07/02/19 10:00  07/08/19 10:13





  Norvasc  PO   10 mg





  QDAY ARASH   Administration


 


Aspirin  81 mg  07/02/19 10:00  07/08/19 10:13





  Baby Aspirin  PO   81 mg





  QDAY ARASH   Administration


 


Carvedilol  3.125 mg  07/04/19 10:00  07/08/19 10:14





  Coreg  PO   3.125 mg





  BID ARASH   Administration


 


Docusate Sodium  100 mg  07/02/19 10:00  07/08/19 10:13





  Colace  PO   100 mg





  BID ARASH   Administration


 


Famotidine  20 mg  07/04/19 00:45  07/08/19 10:13





  Pepcid  PO   20 mg





  BID ARASH   Administration


 


Heparin Sodium (Porcine)  5,000 unit  07/02/19 10:00  07/08/19 10:19





  Heparin  SUB-Q   Not Given





  Q12HR ARASH  


 


Hydralazine HCl  10 mg  07/02/19 04:57  07/04/19 14:24





  Apresoline  IV   10 mg





  Q4H PRN   Administration





  Blood Pressure  


 


Hydralazine HCl  50 mg  07/04/19 08:00  07/08/19 08:42





  Apresoline  PO   50 mg





  TID ARASH   Administration


 


Ondansetron HCl  4 mg  07/02/19 04:52  07/05/19 05:51





  Zofran  IV   4 mg





  Q8H PRN   Administration





  Nausea And Vomiting  


 


Oxybutynin Chloride  2.5 mg  07/04/19 10:00  07/08/19 10:12





  Ditropan  PO   2.5 mg





  DAILY ARASH   Administration


 


Oxycodone/Acetaminophen  1 tab  07/02/19 04:52  07/03/19 18:30





  Percocet 5/325  PO   1 tab





  Q6H PRN   Administration





  Pain, Moderate (4-6)  


 


Sodium Chloride  10 ml  07/02/19 10:00  07/08/19 10:19





  Sodium Chloride Flush Syringe 10 Ml  IV   10 ml





  BID ARASH   Administration


 


Sodium Chloride  10 ml  07/02/19 04:52 





  Sodium Chloride Flush Syringe 10 Ml  IV  





  PRN PRN  





  LINE FLUSH

## 2019-07-08 NOTE — PROGRESS NOTE
Assessment and Plan





Acute on chronic systolic heart failure


   Patient takes torsemide 20 mg three times daily as outpatient


Hx of Cardiomyopathy


   Echo showing 4 chamber dilatation with LVEF 10-15%


   pt reports he is followed by a cardiologist in [place]


Acute renal failure - resolved


Systemic Hypertension


COPD


Throat cancer





Recommendations:


Diuretics as per Nephrology.


Continue medical therapy for chronic systolic heart failure.


Otherwise, conservative cardiac management.





Subjective


Date of service: 07/08/19


Interval history: 





Patient reports his LE edema is less. Admits he is diuresing well. Wants to go 

home.





Objective


                                   Vital Signs











  Temp Pulse Pulse Resp Resp BP BP


 


 07/08/19 10:14   66     171/92 


 


 07/08/19 10:13   66     171/92 


 


 07/08/19 10:12       171/92 


 


 07/08/19 10:00     20   


 


 07/08/19 08:42   65     174/91 


 


 07/08/19 07:43  97.2 F L  65   20   174/91 


 


 07/08/19 03:20  98.3 F  54 L   18    164/66


 


 07/07/19 22:00   70     


 


 07/07/19 21:50   70     168/80 


 


 07/07/19 20:40   68     167/88 


 


 07/07/19 20:25  98.6 F  68   20   167/88 


 


 07/07/19 16:14    84   18  


 


 07/07/19 14:03  97.6 F  69   18   171/94 


 


 07/07/19 12:34   69     177/100 


 


 07/07/19 12:32   69     177/100 














  Pulse Ox


 


 07/08/19 10:14 


 


 07/08/19 10:13 


 


 07/08/19 10:12 


 


 07/08/19 10:00  99


 


 07/08/19 08:42 


 


 07/08/19 07:43  99


 


 07/08/19 03:20  100


 


 07/07/19 22:00  97


 


 07/07/19 21:50 


 


 07/07/19 20:40 


 


 07/07/19 20:25  97


 


 07/07/19 16:14 


 


 07/07/19 14:03  92


 


 07/07/19 12:34 


 


 07/07/19 12:32 














- Physical Examination


General: Appears Well, No Apparent Distress


HEENT: Positive: PERRL


Neck: Positive: trachea midline


Cardiac: Positive: Reg Rate and Rhythm


Lungs: Positive: Decreased Breath Sounds


Neuro: Positive: Grossly Intact


Abdomen: Positive: Soft


Extremities: Present: +1 Edema





- Labs and Meds


                          Comprehensive Metabolic Panel











  07/08/19 Range/Units





  05:41 


 


Sodium  154 H D  (137-145)  mmol/L


 


Potassium  3.6  (3.6-5.0)  mmol/L


 


Chloride  95.3 L  ()  mmol/L


 


Carbon Dioxide  27  (22-30)  mmol/L


 


BUN  26 H  (9-20)  mg/dL


 


Creatinine  1.4  (0.8-1.5)  mg/dL


 


Glucose  115 H  ()  mg/dL


 


Calcium  9.0  (8.4-10.2)  mg/dL

## 2019-07-08 NOTE — PROGRESS NOTE
Assessment and Plan


Assessment and plan: 





73-year-old -American male with history of COPD, CHF, HTN, throat cancer 

who presents to [facility] ED with complaints of progressively worsening bilateral 

lower extremity pitting edema for the past 2-3 days.  On examination patient has

bilateral lower extremity 2+ pitting edema.   chest x-ray is suggestive of 

volume overload/CHF exacerbation.





Acute on Chronic combined systolic and diastolic heart failure


- Echo showed Dilated cardiomyopathy left ventricular ejection fraction EF 10-

15%


- Cardiology following, continue diuresis





Hypertensive urgency


Optimize blood pressure medications





Acute hypoxic respiratory failure


- Resolved, Patient is saturating well on room air





Chronic obstructive pulmonary disease


-Compensated





JAYDEN due to vasomotor nephropathy


Nephrology input appreciated, improving





Hypokalemia


Continue to replete, check magnesium





Hypernatremia; improving





Mild to moderate malnutrition


- Nutrition consult





Dysphagia


- Speech therapy evaluated her and recommend [place] consistency





Deconditioned


- PT/OT consulted





Disposition; continue inpatient care , tentative dc home tomorrow





History


Interval history: 





Review of systems


Constitutional: No fevers, no malaise, no joint pains





CVS: No chest pain, orthopnea and dyspnea on exertion is improving





GI: No abdominal pain, no diarrhea, no vomiting, no constipation





Respiratory: no wheezing, no coughing





Hospitalist Physical





- Physical exam


Narrative exam: 





General.: Appears well, no distress, nontoxic


HEENT: Moist mucous membranes, extraocular muscles intact, no lymphadenopathy


Neck: supple


Cardiac: S1-S2 heard


Lungs: Crackles in bases


Abdomen: soft , nontender,  nondistended,  bowel sounds positive


Extremities: 3 plus edema in LE


Skin: no rash or lesions


Neurologic: no gross focal deficits


Psych: calm, and cooperative





- Constitutional


Vitals: 


                                        











Temp Pulse Resp BP Pulse Ox


 


 97.2 F L  55 L  20   150/80   99 


 


 07/08/19 07:43  07/08/19 14:31  07/08/19 10:00  07/08/19 14:31  07/08/19 10:00











General appearance: Present: no acute distress





Results





- Labs


CBC & Chem 7: 


                                 07/03/19 04:43





                                 07/08/19 05:41


Labs: 


                             Laboratory Last Values











WBC  4.4 K/mm3 (4.5-11.0)  L  07/03/19  04:43    


 


RBC  4.67 M/mm3 (3.65-5.03)   07/03/19  04:43    


 


Hgb  13.1 gm/dl (11.8-15.2)   07/03/19  04:43    


 


Hct  40.4 % (35.5-45.6)   07/03/19  04:43    


 


MCV  87 fl (84-94)   07/03/19  04:43    


 


MCH  28 pg (28-32)   07/03/19  04:43    


 


MCHC  33 % (32-34)   07/03/19  04:43    


 


RDW  20.4 % (13.2-15.2)  H  07/03/19  04:43    


 


Plt Count  309 K/mm3 (140-440)   07/03/19  04:43    


 


Lymph % (Auto)  25.7 % (13.4-35.0)   07/03/19  04:43    


 


Mono % (Auto)  10.7 % (0.0-7.3)  H  07/03/19  04:43    


 


Eos % (Auto)  7.5 % (0.0-4.3)  H  07/03/19  04:43    


 


Baso % (Auto)  1.4 % (0.0-1.8)   07/03/19  04:43    


 


Lymph #  1.1 K/mm3 (1.2-5.4)  L  07/03/19  04:43    


 


Mono #  0.5 K/mm3 (0.0-0.8)   07/03/19  04:43    


 


Eos #  0.3 K/mm3 (0.0-0.4)   07/03/19  04:43    


 


Baso #  0.1 K/mm3 (0.0-0.1)   07/03/19  04:43    


 


Seg Neutrophils %  54.7 % (40.0-70.0)   07/03/19  04:43    


 


Seg Neutrophils #  2.4 K/mm3 (1.8-7.7)   07/03/19  04:43    


 


Sodium  154 mmol/L (137-145)  H D 07/08/19  05:41    


 


Potassium  3.6 mmol/L (3.6-5.0)   07/08/19  05:41    


 


Chloride  95.3 mmol/L ()  L  07/08/19  05:41    


 


Carbon Dioxide  27 mmol/L (22-30)   07/08/19  05:41    


 


  35 mmol/L  07/08/19  05:41    


 


BUN  26 mg/dL (9-20)  H  07/08/19  05:41    


 


  1.4 mg/dL (0.8-1.5)   07/08/19  05:41    


 


Estimated GFR  > 60 ml/min  07/08/19  05:41    


 


  19 %  07/08/19  05:41    


 


Glucose  115 mg/dL ()  H  07/08/19  05:41    


 


Calcium  9.0 mg/dL (8.4-10.2)   07/08/19  05:41    


 


Magnesium  2.10 mg/dL (1.7-2.3)   07/08/19  05:41    


 


  1.30 mg/dL (0.1-1.2)  H  07/02/19  01:11    


 


AST  17 units/L (5-40)   07/02/19  01:11    


 


ALT  19 units/L (7-56)   07/02/19  01:11    


 


  172 units/L ()  H  07/02/19  01:11    


 


  64 units/L ()   07/02/19  01:11    


 


CK-MB (CK-2)  1.8 ng/mL (0.0-4.0)   07/02/19  01:11    


 


CK-MB (CK-2) Rel Index  2.8  (0-4)   07/02/19  01:11    


 


  0.014 ng/mL (0.00-0.029)   07/02/19  01:11    


 


NT-Pro-B Natriuret Pep  50300 pg/mL (0-900)  H  07/02/19  01:11    


 


  6.4 g/dL (6.3-8.2)   07/02/19  01:11    


 


  3.2 g/dL (3.9-5)  L  07/02/19  01:11    


 


  1.0 %  07/02/19  01:11    


 


PTH Intact  232.5 pg/mL (15-65)  H  07/03/19  04:43    


 


  Yellow  (Yellow)   07/02/19  Unknown


 


  Clear  (Clear)   07/02/19  Unknown


 


  6.0  (5.0-7.0)   07/02/19  Unknown


 


Ur Specific Gravity  1.012  (1.003-1.030)   07/02/19  Unknown


 


  <15 mg/dl mg/dL (Negative)   07/02/19  Unknown


 


  Neg mg/dL (Negative)   07/02/19  Unknown


 


  Neg mg/dL (Negative)   07/02/19  Unknown


 


  Neg  (Negative)   07/02/19  Unknown


 


  Neg  (Negative)   07/02/19  Unknown


 


  Neg  (Negative)   07/02/19  Unknown


 


  4.0 mg/dL (<2.0)   07/02/19  Unknown


 


Ur Leukocyte Esterase  Neg  (Negative)   07/02/19  Unknown


 


  < 1.0 /HPF (0.0-6.0)   07/02/19  Unknown


 


  1.0 /HPF (0.0-6.0)   07/02/19  Unknown


 


  64.4 mg/dL (0.1-20.0)  H  07/02/19  Unknown


 


Protein/Creatinin Ratio  0.29   07/02/19  Unknown


 


  49 mmol/L  07/02/19  Unknown


 


  18 mg/dL (5-11.8)  H  07/02/19  Unknown


 


Plasma/Serum Alcohol  < 0.01 % (0-0.07)   07/02/19  01:11    














Active Medications





- Current Medications


Current Medications: 














Generic Name Dose Route Start Last Admin





  Trade Name Freq  PRN Reason Stop Dose Admin


 


Acetaminophen  650 mg  07/02/19 04:52  07/05/19 03:56





  Tylenol  PO   650 mg





  Q4H PRN   Administration





  Pain MILD(1-3)/Fever >100.5/HA  


 


Albuterol  2.5 mg  07/02/19 04:52  07/07/19 16:12





  Proventil  IH   2.5 mg





  Q3HRT PRN   Administration





  Shortness Of Breath  


 


Amlodipine Besylate  10 mg  07/02/19 10:00  07/08/19 10:13





  Norvasc  PO   10 mg





  QDAY ARASH   Administration


 


Aspirin  81 mg  07/02/19 10:00  07/08/19 10:13





  Baby Aspirin  PO   81 mg





  QDAY ARASH   Administration


 


Carvedilol  3.125 mg  07/04/19 10:00  07/08/19 10:14





  Coreg  PO   3.125 mg





  BID ARASH   Administration


 


Docusate Sodium  100 mg  07/02/19 10:00  07/08/19 10:13





  Colace  PO   100 mg





  BID ARASH   Administration


 


Famotidine  20 mg  07/04/19 00:45  07/08/19 10:13





  Pepcid  PO   20 mg





  BID ARASH   Administration


 


Heparin Sodium (Porcine)  5,000 unit  07/02/19 10:00  07/08/19 10:19





  Heparin  SUB-Q   Not Given





  Q12HR ARASH  


 


Hydralazine HCl  10 mg  07/02/19 04:57  07/04/19 14:24





  Apresoline  IV   10 mg





  Q4H PRN   Administration





  Blood Pressure  


 


Hydralazine HCl  50 mg  07/04/19 08:00  07/08/19 14:31





  Apresoline  PO   50 mg





  TID ARASH   Administration


 


Ondansetron HCl  4 mg  07/02/19 04:52  07/05/19 05:51





  Zofran  IV   4 mg





  Q8H PRN   Administration





  Nausea And Vomiting  


 


Oxybutynin Chloride  2.5 mg  07/04/19 10:00  07/08/19 10:12





  Ditropan  PO   2.5 mg





  DAILY ARASH   Administration


 


Oxycodone/Acetaminophen  1 tab  07/02/19 04:52  07/03/19 18:30





  Percocet 5/325  PO   1 tab





  Q6H PRN   Administration





  Pain, Moderate (4-6)  


 


Sodium Chloride  10 ml  07/02/19 10:00  07/08/19 10:19





  Sodium Chloride Flush Syringe 10 Ml  IV   10 ml





  BID ARASH   Administration


 


Sodium Chloride  10 ml  07/02/19 04:52 





  Sodium Chloride Flush Syringe 10 Ml  IV  





  PRN PRN  





  LINE FLUSH  














Nutrition/Malnutrition Assess





- Dietary Evaluation


Nutrition/Malnutrition Findings: 


                                        





Nutrition Notes                                            Start:  07/02/19 

09:38


Freq:                                                      Status: Active       




Protocol:                                                                       




 Document     07/03/19 16:17  RM  (Rec: 07/03/19 16:19    [id number])


 Nutrition Notes


     Initial or Follow up                        Brief Note


     Current Diagnosis                           CKD(stage I-IV),COPD,


                                                 Hypertension,Heart Failure


     Other Pertinent Diagnosis                   Throat CA


     Current Diet                                Pureed


     Height                                      5 ft 7 in


     Weight                                      88.6 kg


     Ideal Body Weight (kg)                      67.27


     BMI                                         30.6


     Subjective/Other Information                Pt stated that his appetite is


                                                 good and that he eats all of


                                                 his meals. Stated that he did


                                                 not receive lunch today and


                                                 was requesting meal to be


                                                 brought at time of visit.


                                                 Writer spoke w/nurse who


                                                 stated that tech was going to


                                                 kitchen to get tray for pt.


 Nutrition Intervention


     Revisit per MD consult or patient           Sign Off


      request:

## 2019-07-09 VITALS — DIASTOLIC BLOOD PRESSURE: 81 MMHG | SYSTOLIC BLOOD PRESSURE: 119 MMHG

## 2019-07-09 LAB
BUN SERPL-MCNC: 26 MG/DL (ref 9–20)
BUN/CREAT SERPL: 20 %
CALCIUM SERPL-MCNC: 8.9 MG/DL (ref 8.4–10.2)
HEMOLYSIS INDEX: 5

## 2019-07-09 RX ADMIN — Medication SCH ML: at 09:29

## 2019-07-09 RX ADMIN — OXYBUTYNIN CHLORIDE SCH MG: 5 TABLET ORAL at 09:22

## 2019-07-09 RX ADMIN — HYDRALAZINE HYDROCHLORIDE SCH MG: 25 TABLET, FILM COATED ORAL at 13:47

## 2019-07-09 RX ADMIN — ASPIRIN SCH MG: 81 TABLET, CHEWABLE ORAL at 09:23

## 2019-07-09 RX ADMIN — HEPARIN SODIUM SCH: 5000 INJECTION, SOLUTION INTRAVENOUS; SUBCUTANEOUS at 09:33

## 2019-07-09 RX ADMIN — HYDROCHLOROTHIAZIDE SCH MG: 25 TABLET ORAL at 09:26

## 2019-07-09 RX ADMIN — NIFEDIPINE SCH: 60 TABLET, EXTENDED RELEASE ORAL at 09:30

## 2019-07-09 RX ADMIN — CARVEDILOL SCH MG: 3.12 TABLET, FILM COATED ORAL at 09:29

## 2019-07-09 RX ADMIN — FAMOTIDINE SCH MG: 20 TABLET ORAL at 09:22

## 2019-07-09 RX ADMIN — ACETAMINOPHEN PRN MG: 325 TABLET ORAL at 02:29

## 2019-07-09 RX ADMIN — HYDRALAZINE HYDROCHLORIDE SCH MG: 25 TABLET, FILM COATED ORAL at 08:15

## 2019-07-09 RX ADMIN — DOCUSATE SODIUM SCH MG: 100 CAPSULE, LIQUID FILLED ORAL at 09:25

## 2019-07-09 NOTE — DISCHARGE SUMMARY
Providers





- Providers


Date of Admission: 


07/02/19 04:52





Attending physician: 


AGUEDA RECIO MD





                                        





07/02/19 04:52


Consult to Physician [CONS] Routine 


   Comment: 


   Consulting Provider: NO QUIROGA


   Physician Instructions: 


   Reason For Exam: JAYDEN vs??CKD unknown baseline





07/02/19 04:57


Consult to Dietitian/Nutrition [CONS] Routine 


   Physician Instructions: 


   Reason For Exam: 


   Reason for Consult: Malnutrition





07/02/19 08:44


Speech Therapy Evaluation and Treat [CONS] Routine 


   Reason For Exam: diffculty of swallowing





07/03/19 12:58


Physical Therapy Evaluation and Treat [CONS] Routine 


   Comment: 


   Reason For Exam: deconditioning





07/05/19 08:54


Consult to Physician [CONS] Routine 


   Comment: 


   Consulting Provider: HELEN NUGENT


   Physician Instructions: 


   Reason For Exam: systolic CHF





07/05/19 13:40


Consult to Wound/ET Nurse [CONS] Routine 


   Reason For Exam: wound eval











Primary care physician: 


[facility], [place]








Hospitalization


Condition: Fair


Hospital course: 





73-year-old -American male with history of COPD, CHF, HTN, throat cancer 

who presents to [facility] ED with complaints of progressively worsening bilateral 

lower extremity pitting edema for the past 2-3 days.  On examination patient has

 bilateral lower extremity 2+ pitting edema.   chest x-ray is suggestive of 

volume overload/CHF exacerbation.





Acute on Chronic combined systolic and diastolic heart failure


- Echo showed Dilated cardiomyopathy left ventricular ejection fraction EF 10-

15%


- Cardiology following, he was diuresed, medications were optimized





Hypertensive urgency


Optimized blood pressure medications





Acute hypoxic respiratory failure


- Resolved after diuresis, Patient is saturating well on room air





Chronic obstructive pulmonary disease


-Compensated





JAYDEN due to vasomotor nephropathy


Nephrology input appreciated, improved with diuresis





Hypokalemia


Patient was put on potassium supplements





Hypernatremia; improved with diuresis





Mild to moderate malnutrition


- Nutrition consult





Dysphagia


- Speech therapy evaluated her and recommend [place] consistency, he tolerated a 

diet well





Deconditioned


- PT/OT consulted, patient going to subacute rehabilitation facility








Disposition: DC/[place]-03 SNF W [facility]


Time spent for discharge: 33 mins





Core Measure Documentation





- Palliative Care


Palliative Care/ Comfort Measures: Not Applicable





- Core Measures


Any of the following diagnoses?: heart failure





- Heart Failure Discharge Requirements


ACE/ARB for LVSD if EF <40%: No


Reason for no ACE/ARB: Renal impairment


Beta blocker at discharge: Yes





Exam





- Physical Exam


Narrative exam: 





General.: Appears well, no distress, nontoxic


HEENT: Moist mucous membranes, extraocular muscles intact, no lymphadenopathy


Neck: supple


Cardiac: S1-S2 heard


Lungs: Crackles in bases


Abdomen: soft , nontender,  nondistended,  bowel sounds positive


Extremities: 1 plus edema in LE


Skin: no rash or lesions


Neurologic: no gross focal deficits


Psych: calm, and cooperative





- Constitutional


Vitals: 


                                        











Temp Pulse Resp BP Pulse Ox


 


 97.4 F L  60   18   119/81   97 


 


 07/09/19 13:16  07/09/19 13:47  07/09/19 13:16  07/09/19 13:47  07/09/19 13:16














Plan


Follow up with: 


[facility] [Primary Care Provider] - 3-5 Days


Prescriptions: 


oxyCODONE /ACETAMINOPHEN [Percocet 5/325 mg] 1 tab PO Q6H PRN #14 tablet


 PRN Reason: Pain, Moderate (4-6)

## 2019-07-09 NOTE — PROGRESS NOTE
Assessment and Plan





1. Acute kidney injury:


Likely Vasomotor JAYDEN in the setting of CHF.


Suspect Cardio-renal syndrome.


Renal US was negative for hydro.


Renal function has improved.


Monitor renal function.


Avoid nephrotoxic agents.


Meds dosage based on GFR.





2. FEN:


Volume overload, on HCTZ.


Hypernatremia, improved.


Hypokalemia, replete K as needed.


Metabolic alkalosis, Diamox prn.


Monitor lytes.





3. Acute decompensated CHF.





4. Hypertension:


Monitor BP.





5. H/o throat cancer.











Subjective


Date of service: 07/09/19


Interval history: 


Patient was seen and examined at the bedside.








Objective





- Vital Signs


Vital signs: 


                               Vital Signs - 12hr











  07/08/19 07/08/19 07/08/19





  20:02 20:33 21:18


 


Temperature 97.5 F L  


 


Pulse Rate 67  67


 


Respiratory 22 22 





Rate   


 


Blood Pressure 151/95  151/95


 


O2 Sat by Pulse 97 97 





Oximetry   














  07/08/19 07/09/19





  21:19 02:20


 


Temperature  97.3 F L


 


Pulse Rate 67 57 L


 


Respiratory  





Rate  


 


Blood Pressure 151/95 163/77


 


O2 Sat by Pulse  98





Oximetry  














- General Appearance


General appearance: well-developed, well-nourished, appears stated age, other 

(no distress)


EENT: ATNC, PERRL, vision intact, hearing diminished


Neck: supple


Respiratory: Present: Clear to Ascultation


Cardiology: regular, S1S2, no murmurs


Gastrointestinal: normoactive bowel sounds, no tenderness, no distended


Integumentary: warm and dry, chronic venous stasis


Neurologic: no asterixis, alert and oriented x3, other (mumbling of words)


Musculoskeletal: other (1+ edema of LEs noted)





- Lab





                                 07/03/19 04:43





                                 07/09/19 04:08


                             Most recent lab results











Calcium  8.9 mg/dL (8.4-10.2)   07/09/19  04:08    


 


Magnesium  2.10 mg/dL (1.7-2.3)   07/08/19  05:41    


 


  64.4 mg/dL (0.1-20.0)  H  07/02/19  Unknown


 


  49 mmol/L  07/02/19  Unknown


 


  18 mg/dL (5-11.8)  H  07/02/19  Unknown














Medications & Allergies





- Medications


Allergies/Adverse Reactions: 


                                    Allergies





levofloxacin [From Levaquin] Allergy (Verified 07/02/19 00:40)


   Unknown








Home Medications: 


                                Home Medications











 Medication  Instructions  Recorded  Confirmed  Last Taken  Type


 


Coreg 3.125 mg PO BID 07/02/19 07/02/19 06/30/19 History


 


Oxybutynin 2.5 mg PO DAILY 07/02/19 07/02/19 06/30/19 History


 


Pepcid 20 mg PO BID 07/02/19 07/02/19 06/30/19 History


 


Ranitidine HCl 150 mg PO BID 07/02/19 07/02/19 06/30/19 History


 


hydrALAZINE 50 mg PO TID 07/02/19 07/02/19 06/30/19 History


 


ALBUTEROL NEB's [Proventil 0.083% 2.5 mg IH Q3HRT PRN  nebu 07/09/19  Unknown Rx





NEBS]     


 


Aspirin [Aspirin BABY CHEW TAB] 81 mg PO QDAY  tab.chew 07/09/19  Unknown Rx


 


Docusate Sodium [Colace CAP] 100 mg PO BID  capsule 07/09/19  Unknown Rx


 


NIFEdipine XL [Procardia Xl] 60 mg PO Q12HR  tablet 07/09/19  Unknown Rx


 


hydroCHLOROthiazide [HCTZ] 25 mg PO QDAY  tablet 07/09/19  Unknown Rx


 


oxyCODONE /ACETAMINOPHEN [Percocet 1 tab PO Q6H PRN #14 tablet 07/09/19  Unknown

 Rx





5/325 mg]     











Active Medications: 














Generic Name Dose Route Start Last Admin





  Trade Name Freq  PRN Reason Stop Dose Admin


 


Acetaminophen  650 mg  07/02/19 04:52  07/09/19 02:29





  Tylenol  PO   650 mg





  Q4H PRN   Administration





  Pain MILD(1-3)/Fever >100.5/HA  


 


Albuterol  2.5 mg  07/02/19 04:52  07/07/19 16:12





  Proventil  IH   2.5 mg





  Q3HRT PRN   Administration





  Shortness Of Breath  


 


Aspirin  81 mg  07/02/19 10:00  07/08/19 10:13





  Baby Aspirin  PO   81 mg





  QDAY ARASH   Administration


 


Carvedilol  3.125 mg  07/04/19 10:00  07/08/19 21:19





  Coreg  PO   3.125 mg





  BID ARASH   Administration


 


Docusate Sodium  100 mg  07/02/19 10:00  07/08/19 21:19





  Colace  PO   100 mg





  BID ARASH   Administration


 


Famotidine  20 mg  07/04/19 00:45  07/08/19 21:19





  Pepcid  PO   20 mg





  BID ARASH   Administration


 


Heparin Sodium (Porcine)  5,000 unit  07/02/19 10:00  07/08/19 21:20





  Heparin  SUB-Q   5,000 unit





  Q12HR ARASH   Administration


 


Hydralazine HCl  10 mg  07/02/19 04:57  07/04/19 14:24





  Apresoline  IV   10 mg





  Q4H PRN   Administration





  Blood Pressure  


 


Hydralazine HCl  50 mg  07/04/19 08:00  07/08/19 21:18





  Apresoline  PO   50 mg





  TID ARASH   Administration


 


Hydrochlorothiazide  25 mg  07/08/19 20:00  07/08/19 21:19





  Hctz  PO   25 mg





  QDAY ARASH   Administration


 


Nifedipine  60 mg  07/08/19 22:00  07/08/19 21:18





  Procardia Xl  PO   60 mg





  Q12HR ARASH   Administration


 


Ondansetron HCl  4 mg  07/02/19 04:52  07/05/19 05:51





  Zofran  IV   4 mg





  Q8H PRN   Administration





  Nausea And Vomiting  


 


Oxybutynin Chloride  2.5 mg  07/04/19 10:00  07/08/19 10:12





  Ditropan  PO   2.5 mg





  DAILY ARASH   Administration


 


Oxycodone/Acetaminophen  1 tab  07/02/19 04:52  07/03/19 18:30





  Percocet 5/325  PO   1 tab





  Q6H PRN   Administration





  Pain, Moderate (4-6)  


 


Sodium Chloride  10 ml  07/02/19 10:00  07/08/19 21:20





  Sodium Chloride Flush Syringe 10 Ml  IV   10 ml





  BID ARASH   Administration


 


Sodium Chloride  10 ml  07/02/19 04:52 





  Sodium Chloride Flush Syringe 10 Ml  IV  





  PRN PRN  





  LINE FLUSH

## 2019-07-09 NOTE — PROGRESS NOTE
Assessment and Plan





Acute on chronic systolic heart failure


   Patient takes torsemide 20 mg three times daily as outpatient


Hx of Cardiomyopathy


   Echo showing 4 chamber dilatation with LVEF 10-15%


   pt reports he is followed by a cardiologist in [facility]


Acute renal failure - resolved


   diuretics held


Systemic Hypertension


COPD


Throat cancer





Recommendations:


Continue medical therapy for chronic systolic heart failure as tolerated.


Otherwise, conservative cardiac management.


When discharged, patient advised to follow up with his primary cardiologist 

within 3-5 days.





Subjective


Date of service: 07/09/19


Interval history: 





Patient has no complaints. No distress noted.





Objective


                                   Vital Signs











  Temp Pulse Resp BP Pulse Ox


 


 07/09/19 08:15   54 L   124/63 


 


 07/09/19 07:58  97.2 F L  54 L  18  124/63  96


 


 07/09/19 02:20  97.3 F L  57 L   163/77  98


 


 07/08/19 21:19   67   151/95 


 


 07/08/19 21:18   67   151/95 


 


 07/08/19 20:33    22   97


 


 07/08/19 20:02  97.5 F L  67  22  151/95  97


 


 07/08/19 14:31   55 L   150/80 


 


 07/08/19 14:16   57 L   153/80  94


 


 07/08/19 10:14   66   171/92 


 


 07/08/19 10:13   66   171/92 


 


 07/08/19 10:12     171/92 


 


 07/08/19 10:00    20   99


 


 07/08/19 08:42   65   174/91 














- Physical Examination


General: No Apparent Distress


HEENT: Positive: PERRL


Neck: Positive: trachea midline


Cardiac: Positive: Reg Rate and Rhythm


Lungs: Positive: Decreased Breath Sounds


Neuro: Positive: Grossly Intact


Extremities: Present: +1 Edema





- Labs and Meds


                          Comprehensive Metabolic Panel











  07/09/19 Range/Units





  04:08 


 


Sodium  145  D  (137-145)  mmol/L


 


Potassium  3.2 L  (3.6-5.0)  mmol/L


 


Chloride  105.3  ()  mmol/L


 


Carbon Dioxide  29  (22-30)  mmol/L


 


BUN  26 H  (9-20)  mg/dL


 


Creatinine  1.3  (0.8-1.5)  mg/dL


 


Glucose  130 H  ()  mg/dL


 


Calcium  8.9  (8.4-10.2)  mg/dL